# Patient Record
Sex: MALE | Race: WHITE | Employment: FULL TIME | URBAN - METROPOLITAN AREA
[De-identification: names, ages, dates, MRNs, and addresses within clinical notes are randomized per-mention and may not be internally consistent; named-entity substitution may affect disease eponyms.]

---

## 2020-01-03 ENCOUNTER — TELEPHONE (OUTPATIENT)
Dept: ORTHOPEDIC SURGERY | Age: 25
End: 2020-01-03

## 2020-01-03 ENCOUNTER — OFFICE VISIT (OUTPATIENT)
Dept: ORTHOPEDIC SURGERY | Age: 25
End: 2020-01-03
Payer: COMMERCIAL

## 2020-01-03 VITALS — BODY MASS INDEX: 28.88 KG/M2 | WEIGHT: 225 LBS | HEIGHT: 74 IN

## 2020-01-03 PROCEDURE — 99203 OFFICE O/P NEW LOW 30 MIN: CPT | Performed by: ORTHOPAEDIC SURGERY

## 2020-01-03 NOTE — PROGRESS NOTES
Chief Complaint    Ankle Pain (right ankle)      History of Present Illness:  Vane Donnelly is a 21 y.o. Cheryn Derrick for evaluation of his right ankle. States he was playing basketball and felt something pop in the posterior aspect of his right ankle. States it felt like someone hit him in the back of the ankle. He immediately had difficulty putting any type of weight on his ankle. He had a friend that did have crutches and a boot and he did place his ankle to a boot and is been using crutches ever since then. Denies any prior history of injury to this right ankle. Denies any prior history of pain within this right ankle prior to this most recent injury. He does work as an  for [employer] and primarily has a seated job. Medical History:  Patient's medications, allergies, past medical, surgical, social and family histories were reviewed and updated as appropriate. Review of Systems:  Pertinent items are noted in HPI  Review of systems reviewed from Patient History Form dated on 1/3/19 and available in the patient's chart under the Media tab. Vital Signs:  Ht 6' 2\" (1.88 m)   Wt 225 lb (102.1 kg)   BMI 28.89 kg/m²     General Exam:   Constitutional: Patient is adequately groomed with no evidence of malnutrition  DTRs: Deep tendon reflexes are intact  Mental Status: The patient is oriented to time, place and person. The patient's mood and affect are appropriate. Foot Examination:    Inspection: There is some swelling and bruising noted about the posterior aspect of the right ankle    Palpation: There is a palpable defect along the Achilles about 5 cm proximal of the attachment onto the calcaneus. Range of Motion: He does have good passive range of motion of the right ankle    Strength: There is weakness noted with resisted plantarflexion    Special Tests: He has a positive Joyce test    Skin: There are no rashes, ulcerations or lesions.     Gait: Antalgic    Reflex 2+ and

## 2020-01-06 NOTE — PROGRESS NOTES
Do not bring valuables (money, credit cards, checkbooks, etc.) Do not wear any makeup (including no eye makeup) or nail polish on your fingers or toes. 10. DO NOT wear any jewelry or piercings on day of surgery. All body piercing jewelry must be removed. 11. If you have ___dentures, they will be removed before going to the OR; we will provide you a container. If you wear ___contact lenses or ___glasses, they will be removed; please bring a case for them. 12. Please see your family doctor/pediatrician for a history & physical and/or concerning medications. Bring any test results/reports from your physician's office. PCP___[facility]_____________Phone___________H&P Appt. Date________             13 If you  have a Living Will and Durable Power of  for Healthcare, please bring in a copy. 15. Notify your Surgeon if you develop any illness between now and surgery  time, cough, cold, fever, sore throat, nausea, vomiting, etc.  Please notify your surgeon if you experience dizziness, shortness of breath or blurred vision between now & the time of your surgery             15. DO NOT shave your operative site 96 hours prior to surgery. For face & neck surgery, men may use an electric razor 48 hours prior to surgery. 16. Shower the night before or morning of surgery as directed. 17. To provide excellent care visitors will be limited to one in the room at any given time. 18.  Please bring picture ID and insurance card. 19.  Visit our web site for additional information:  [email or web address]              20.During flu season no children under the age of 15 are permitted in the hospital for the safety of all patients.                               21. If you take a long acting insulin in the evening only  take half of your usual  dose the night  before your procedure              22. If you use a c-pap please bring DOS if staying overnight,             23.For your convenience [facility] has a pharmacy on site to fill your prescriptions. 24. If you use oxygen and have a portable tank please bring it  with you the DOS             25. Bring a complete list of all your medications with name and dose include any supplements. 26. Other__________________________________________   *Please call pre admission testing if you any further questions   [address]    [address]  [phone]   [address] [place] [address]       All above information reviewed with patient in person or by phone. Patient verbalizes understanding. All questions and concerns addressed.                                                                                                  Patient/Rep_____pt_______________                                                                                                                                    PRE OP INSTRUCTIONS

## 2020-01-09 ENCOUNTER — ANESTHESIA (OUTPATIENT)
Dept: OPERATING ROOM | Age: 25
End: 2020-01-09
Payer: COMMERCIAL

## 2020-01-09 ENCOUNTER — HOSPITAL ENCOUNTER (OUTPATIENT)
Age: 25
Setting detail: OUTPATIENT SURGERY
Discharge: HOME | End: 2020-01-09
Attending: ORTHOPAEDIC SURGERY | Admitting: ORTHOPAEDIC SURGERY
Payer: COMMERCIAL

## 2020-01-09 ENCOUNTER — ANESTHESIA EVENT (OUTPATIENT)
Dept: OPERATING ROOM | Age: 25
End: 2020-01-09
Payer: COMMERCIAL

## 2020-01-09 VITALS
TEMPERATURE: 98.3 F | HEART RATE: 76 BPM | HEIGHT: 74 IN | WEIGHT: 225 LBS | BODY MASS INDEX: 28.88 KG/M2 | SYSTOLIC BLOOD PRESSURE: 135 MMHG | RESPIRATION RATE: 16 BRPM | DIASTOLIC BLOOD PRESSURE: 75 MMHG | OXYGEN SATURATION: 100 %

## 2020-01-09 VITALS
RESPIRATION RATE: 15 BRPM | TEMPERATURE: 95.2 F | SYSTOLIC BLOOD PRESSURE: 119 MMHG | DIASTOLIC BLOOD PRESSURE: 66 MMHG | OXYGEN SATURATION: 97 %

## 2020-01-09 PROCEDURE — 6360000002 HC RX W HCPCS: Performed by: NURSE ANESTHETIST, CERTIFIED REGISTERED

## 2020-01-09 PROCEDURE — 2709999900 HC NON-CHARGEABLE SUPPLY: Performed by: ORTHOPAEDIC SURGERY

## 2020-01-09 PROCEDURE — 2580000003 HC RX 258: Performed by: NURSE ANESTHETIST, CERTIFIED REGISTERED

## 2020-01-09 PROCEDURE — 3700000001 HC ADD 15 MINUTES (ANESTHESIA): Performed by: ORTHOPAEDIC SURGERY

## 2020-01-09 PROCEDURE — 3700000000 HC ANESTHESIA ATTENDED CARE: Performed by: ORTHOPAEDIC SURGERY

## 2020-01-09 PROCEDURE — 7100000011 HC PHASE II RECOVERY - ADDTL 15 MIN: Performed by: ORTHOPAEDIC SURGERY

## 2020-01-09 PROCEDURE — 7100000000 HC PACU RECOVERY - FIRST 15 MIN: Performed by: ORTHOPAEDIC SURGERY

## 2020-01-09 PROCEDURE — 7100000001 HC PACU RECOVERY - ADDTL 15 MIN: Performed by: ORTHOPAEDIC SURGERY

## 2020-01-09 PROCEDURE — 6360000002 HC RX W HCPCS: Performed by: ORTHOPAEDIC SURGERY

## 2020-01-09 PROCEDURE — 2780000010 HC IMPLANT OTHER: Performed by: ORTHOPAEDIC SURGERY

## 2020-01-09 PROCEDURE — 3600000012 HC SURGERY LEVEL 2 ADDTL 15MIN: Performed by: ORTHOPAEDIC SURGERY

## 2020-01-09 PROCEDURE — 2580000003 HC RX 258: Performed by: ORTHOPAEDIC SURGERY

## 2020-01-09 PROCEDURE — 2500000003 HC RX 250 WO HCPCS: Performed by: NURSE ANESTHETIST, CERTIFIED REGISTERED

## 2020-01-09 PROCEDURE — 2500000003 HC RX 250 WO HCPCS: Performed by: ORTHOPAEDIC SURGERY

## 2020-01-09 PROCEDURE — 3600000002 HC SURGERY LEVEL 2 BASE: Performed by: ORTHOPAEDIC SURGERY

## 2020-01-09 PROCEDURE — 7100000010 HC PHASE II RECOVERY - FIRST 15 MIN: Performed by: ORTHOPAEDIC SURGERY

## 2020-01-09 PROCEDURE — 6370000000 HC RX 637 (ALT 250 FOR IP): Performed by: ANESTHESIOLOGY

## 2020-01-09 DEVICE — IMPLANTABLE DEVICE
Type: IMPLANTABLE DEVICE | Site: ACHILLES TENDON | Status: FUNCTIONAL
Brand: BIOINDUCTIVE IMPLANT WITH ARTHROSCOPIC DELIVERY SYSTEM - MEDIUM

## 2020-01-09 RX ORDER — ONDANSETRON 2 MG/ML
INJECTION INTRAMUSCULAR; INTRAVENOUS PRN
Status: DISCONTINUED | OUTPATIENT
Start: 2020-01-09 | End: 2020-01-09 | Stop reason: SDUPTHER

## 2020-01-09 RX ORDER — FENTANYL CITRATE 50 UG/ML
INJECTION, SOLUTION INTRAMUSCULAR; INTRAVENOUS PRN
Status: DISCONTINUED | OUTPATIENT
Start: 2020-01-09 | End: 2020-01-09 | Stop reason: SDUPTHER

## 2020-01-09 RX ORDER — ROCURONIUM BROMIDE 10 MG/ML
INJECTION, SOLUTION INTRAVENOUS PRN
Status: DISCONTINUED | OUTPATIENT
Start: 2020-01-09 | End: 2020-01-09 | Stop reason: SDUPTHER

## 2020-01-09 RX ORDER — KETOROLAC TROMETHAMINE 30 MG/ML
INJECTION, SOLUTION INTRAMUSCULAR; INTRAVENOUS PRN
Status: DISCONTINUED | OUTPATIENT
Start: 2020-01-09 | End: 2020-01-09 | Stop reason: SDUPTHER

## 2020-01-09 RX ORDER — SODIUM CHLORIDE 9 MG/ML
INJECTION, SOLUTION INTRAVENOUS CONTINUOUS
Status: DISCONTINUED | OUTPATIENT
Start: 2020-01-09 | End: 2020-01-09 | Stop reason: HOSPADM

## 2020-01-09 RX ORDER — ACETAMINOPHEN 500 MG
1000 TABLET ORAL EVERY 6 HOURS PRN
Qty: 60 TABLET | Refills: 3 | Status: SHIPPED | OUTPATIENT
Start: 2020-01-09

## 2020-01-09 RX ORDER — SODIUM CHLORIDE 9 MG/ML
INJECTION, SOLUTION INTRAVENOUS CONTINUOUS PRN
Status: DISCONTINUED | OUTPATIENT
Start: 2020-01-09 | End: 2020-01-09 | Stop reason: SDUPTHER

## 2020-01-09 RX ORDER — PROMETHAZINE HYDROCHLORIDE 25 MG/ML
6.25 INJECTION, SOLUTION INTRAMUSCULAR; INTRAVENOUS
Status: DISCONTINUED | OUTPATIENT
Start: 2020-01-09 | End: 2020-01-09 | Stop reason: HOSPADM

## 2020-01-09 RX ORDER — SODIUM CHLORIDE 0.9 % (FLUSH) 0.9 %
10 SYRINGE (ML) INJECTION EVERY 12 HOURS SCHEDULED
Status: DISCONTINUED | OUTPATIENT
Start: 2020-01-09 | End: 2020-01-09 | Stop reason: HOSPADM

## 2020-01-09 RX ORDER — HYDROMORPHONE HCL 110MG/55ML
0.5 PATIENT CONTROLLED ANALGESIA SYRINGE INTRAVENOUS EVERY 5 MIN PRN
Status: DISCONTINUED | OUTPATIENT
Start: 2020-01-09 | End: 2020-01-09 | Stop reason: HOSPADM

## 2020-01-09 RX ORDER — CEFAZOLIN SODIUM 2 G/100ML
2 INJECTION, SOLUTION INTRAVENOUS
Status: COMPLETED | OUTPATIENT
Start: 2020-01-09 | End: 2020-01-09

## 2020-01-09 RX ORDER — LIDOCAINE HYDROCHLORIDE 20 MG/ML
INJECTION, SOLUTION EPIDURAL; INFILTRATION; INTRACAUDAL; PERINEURAL PRN
Status: DISCONTINUED | OUTPATIENT
Start: 2020-01-09 | End: 2020-01-09 | Stop reason: SDUPTHER

## 2020-01-09 RX ORDER — PROPOFOL 10 MG/ML
INJECTION, EMULSION INTRAVENOUS PRN
Status: DISCONTINUED | OUTPATIENT
Start: 2020-01-09 | End: 2020-01-09 | Stop reason: SDUPTHER

## 2020-01-09 RX ORDER — FENTANYL CITRATE 50 UG/ML
25 INJECTION, SOLUTION INTRAMUSCULAR; INTRAVENOUS EVERY 5 MIN PRN
Status: DISCONTINUED | OUTPATIENT
Start: 2020-01-09 | End: 2020-01-09 | Stop reason: HOSPADM

## 2020-01-09 RX ORDER — DEXAMETHASONE SODIUM PHOSPHATE 4 MG/ML
INJECTION, SOLUTION INTRA-ARTICULAR; INTRALESIONAL; INTRAMUSCULAR; INTRAVENOUS; SOFT TISSUE PRN
Status: DISCONTINUED | OUTPATIENT
Start: 2020-01-09 | End: 2020-01-09 | Stop reason: SDUPTHER

## 2020-01-09 RX ORDER — MIDAZOLAM HYDROCHLORIDE 1 MG/ML
INJECTION INTRAMUSCULAR; INTRAVENOUS PRN
Status: DISCONTINUED | OUTPATIENT
Start: 2020-01-09 | End: 2020-01-09 | Stop reason: SDUPTHER

## 2020-01-09 RX ORDER — SUCCINYLCHOLINE/SOD CL,ISO/PF 200MG/10ML
SYRINGE (ML) INTRAVENOUS PRN
Status: DISCONTINUED | OUTPATIENT
Start: 2020-01-09 | End: 2020-01-09 | Stop reason: SDUPTHER

## 2020-01-09 RX ORDER — OXYCODONE HYDROCHLORIDE 5 MG/1
10 TABLET ORAL ONCE
Status: COMPLETED | OUTPATIENT
Start: 2020-01-09 | End: 2020-01-09

## 2020-01-09 RX ORDER — OXYCODONE HYDROCHLORIDE 5 MG/1
5 TABLET ORAL EVERY 4 HOURS PRN
Qty: 30 TABLET | Refills: 0 | Status: SHIPPED | OUTPATIENT
Start: 2020-01-09 | End: 2020-01-16

## 2020-01-09 RX ORDER — OXYCODONE HYDROCHLORIDE 5 MG/1
TABLET ORAL
Status: DISCONTINUED
Start: 2020-01-09 | End: 2020-01-09 | Stop reason: WASHOUT

## 2020-01-09 RX ORDER — LABETALOL HYDROCHLORIDE 5 MG/ML
5 INJECTION, SOLUTION INTRAVENOUS EVERY 10 MIN PRN
Status: DISCONTINUED | OUTPATIENT
Start: 2020-01-09 | End: 2020-01-09 | Stop reason: HOSPADM

## 2020-01-09 RX ORDER — SODIUM CHLORIDE 0.9 % (FLUSH) 0.9 %
10 SYRINGE (ML) INJECTION PRN
Status: DISCONTINUED | OUTPATIENT
Start: 2020-01-09 | End: 2020-01-09 | Stop reason: HOSPADM

## 2020-01-09 RX ORDER — IBUPROFEN 800 MG/1
800 TABLET ORAL EVERY 6 HOURS PRN
Qty: 60 TABLET | Refills: 3 | Status: SHIPPED | OUTPATIENT
Start: 2020-01-09

## 2020-01-09 RX ORDER — BUPIVACAINE HYDROCHLORIDE 5 MG/ML
INJECTION, SOLUTION EPIDURAL; INTRACAUDAL
Status: COMPLETED | OUTPATIENT
Start: 2020-01-09 | End: 2020-01-09

## 2020-01-09 RX ORDER — SODIUM CHLORIDE, SODIUM LACTATE, POTASSIUM CHLORIDE, CALCIUM CHLORIDE 600; 310; 30; 20 MG/100ML; MG/100ML; MG/100ML; MG/100ML
INJECTION, SOLUTION INTRAVENOUS CONTINUOUS
Status: DISCONTINUED | OUTPATIENT
Start: 2020-01-09 | End: 2020-01-09 | Stop reason: HOSPADM

## 2020-01-09 RX ORDER — LIDOCAINE HYDROCHLORIDE 10 MG/ML
0.5 INJECTION, SOLUTION EPIDURAL; INFILTRATION; INTRACAUDAL; PERINEURAL ONCE
Status: DISCONTINUED | OUTPATIENT
Start: 2020-01-09 | End: 2020-01-09 | Stop reason: HOSPADM

## 2020-01-09 RX ADMIN — DEXAMETHASONE SODIUM PHOSPHATE 8 MG: 4 INJECTION, SOLUTION INTRAMUSCULAR; INTRAVENOUS at 12:40

## 2020-01-09 RX ADMIN — CEFAZOLIN SODIUM 2 G: 2 INJECTION, SOLUTION INTRAVENOUS at 12:12

## 2020-01-09 RX ADMIN — ONDANSETRON 4 MG: 2 INJECTION INTRAMUSCULAR; INTRAVENOUS at 13:17

## 2020-01-09 RX ADMIN — PROPOFOL 50 MG: 10 INJECTION, EMULSION INTRAVENOUS at 12:31

## 2020-01-09 RX ADMIN — OXYCODONE HYDROCHLORIDE 10 MG: 5 TABLET ORAL at 14:33

## 2020-01-09 RX ADMIN — KETOROLAC TROMETHAMINE 30 MG: 60 INJECTION, SOLUTION INTRAMUSCULAR at 13:29

## 2020-01-09 RX ADMIN — FENTANYL CITRATE 50 MCG: 50 INJECTION, SOLUTION INTRAMUSCULAR; INTRAVENOUS at 13:23

## 2020-01-09 RX ADMIN — LIDOCAINE HYDROCHLORIDE 80 MG: 20 INJECTION, SOLUTION EPIDURAL; INFILTRATION; INTRACAUDAL; PERINEURAL at 12:24

## 2020-01-09 RX ADMIN — PROPOFOL 250 MG: 10 INJECTION, EMULSION INTRAVENOUS at 12:24

## 2020-01-09 RX ADMIN — FENTANYL CITRATE 100 MCG: 50 INJECTION, SOLUTION INTRAMUSCULAR; INTRAVENOUS at 12:24

## 2020-01-09 RX ADMIN — Medication 140 MG: at 12:24

## 2020-01-09 RX ADMIN — PROPOFOL 50 MG: 10 INJECTION, EMULSION INTRAVENOUS at 12:38

## 2020-01-09 RX ADMIN — SODIUM CHLORIDE, POTASSIUM CHLORIDE, SODIUM LACTATE AND CALCIUM CHLORIDE: 600; 310; 30; 20 INJECTION, SOLUTION INTRAVENOUS at 12:05

## 2020-01-09 RX ADMIN — MIDAZOLAM 2 MG: 1 INJECTION INTRAMUSCULAR; INTRAVENOUS at 12:17

## 2020-01-09 RX ADMIN — SODIUM CHLORIDE: 9 INJECTION, SOLUTION INTRAVENOUS at 13:13

## 2020-01-09 RX ADMIN — FENTANYL CITRATE 50 MCG: 50 INJECTION, SOLUTION INTRAMUSCULAR; INTRAVENOUS at 13:26

## 2020-01-09 RX ADMIN — ROCURONIUM BROMIDE 5 MG: 10 INJECTION, SOLUTION INTRAVENOUS at 12:24

## 2020-01-09 ASSESSMENT — PULMONARY FUNCTION TESTS
PIF_VALUE: 0
PIF_VALUE: 17
PIF_VALUE: 5
PIF_VALUE: 15
PIF_VALUE: 16
PIF_VALUE: 6
PIF_VALUE: 15
PIF_VALUE: 15
PIF_VALUE: 5
PIF_VALUE: 15
PIF_VALUE: 15
PIF_VALUE: 16
PIF_VALUE: 15
PIF_VALUE: 15
PIF_VALUE: 0
PIF_VALUE: 20
PIF_VALUE: 5
PIF_VALUE: 5
PIF_VALUE: 15
PIF_VALUE: 17
PIF_VALUE: 17
PIF_VALUE: 5
PIF_VALUE: 16
PIF_VALUE: 20
PIF_VALUE: 15
PIF_VALUE: 15
PIF_VALUE: 17
PIF_VALUE: 6
PIF_VALUE: 15
PIF_VALUE: 16
PIF_VALUE: 2
PIF_VALUE: 15
PIF_VALUE: 7
PIF_VALUE: 6
PIF_VALUE: 17
PIF_VALUE: 15
PIF_VALUE: 11
PIF_VALUE: 1
PIF_VALUE: 17
PIF_VALUE: 16
PIF_VALUE: 15
PIF_VALUE: 15
PIF_VALUE: 13
PIF_VALUE: 5
PIF_VALUE: 17
PIF_VALUE: 15
PIF_VALUE: 17
PIF_VALUE: 16
PIF_VALUE: 17
PIF_VALUE: 15
PIF_VALUE: 17
PIF_VALUE: 6
PIF_VALUE: 5
PIF_VALUE: 15
PIF_VALUE: 17
PIF_VALUE: 15
PIF_VALUE: 15
PIF_VALUE: 17
PIF_VALUE: 17
PIF_VALUE: 15
PIF_VALUE: 20
PIF_VALUE: 17
PIF_VALUE: 2
PIF_VALUE: 5
PIF_VALUE: 17
PIF_VALUE: 15
PIF_VALUE: 15
PIF_VALUE: 6
PIF_VALUE: 15
PIF_VALUE: 1
PIF_VALUE: 20
PIF_VALUE: 17
PIF_VALUE: 14
PIF_VALUE: 20
PIF_VALUE: 0
PIF_VALUE: 15
PIF_VALUE: 16
PIF_VALUE: 20
PIF_VALUE: 5
PIF_VALUE: 15
PIF_VALUE: 29
PIF_VALUE: 16
PIF_VALUE: 15
PIF_VALUE: 6
PIF_VALUE: 17
PIF_VALUE: 14
PIF_VALUE: 15
PIF_VALUE: 15
PIF_VALUE: 17
PIF_VALUE: 14
PIF_VALUE: 12
PIF_VALUE: 14
PIF_VALUE: 15
PIF_VALUE: 16

## 2020-01-09 ASSESSMENT — PAIN DESCRIPTION - DESCRIPTORS
DESCRIPTORS: DISCOMFORT
DESCRIPTORS: ACHING
DESCRIPTORS: DISCOMFORT

## 2020-01-09 ASSESSMENT — PAIN - FUNCTIONAL ASSESSMENT
PAIN_FUNCTIONAL_ASSESSMENT: 0-10
PAIN_FUNCTIONAL_ASSESSMENT: PREVENTS OR INTERFERES SOME ACTIVE ACTIVITIES AND ADLS

## 2020-01-09 ASSESSMENT — PAIN DESCRIPTION - ORIENTATION
ORIENTATION: RIGHT

## 2020-01-09 ASSESSMENT — PAIN DESCRIPTION - LOCATION
LOCATION: FOOT

## 2020-01-09 ASSESSMENT — PAIN SCALES - GENERAL
PAINLEVEL_OUTOF10: 5
PAINLEVEL_OUTOF10: 3
PAINLEVEL_OUTOF10: 5

## 2020-01-09 ASSESSMENT — PAIN DESCRIPTION - PAIN TYPE
TYPE: SURGICAL PAIN

## 2020-01-09 NOTE — PROGRESS NOTES
Pt awake and alert. RA, VSS. Family at bedside. Pt c/o some mild discomfort. See MAR. Tolerating PO. Skin warm RLE, palpable pulses and able to wiggle toes. Pt meets criteria to be discharged from phase 1.

## 2020-01-09 NOTE — ANESTHESIA PRE PROCEDURE
[facility] Department of Anesthesiology  Pre-Anesthesia Evaluation/Consultation       Name:  Shauna Lomas  : 1995  Age:  21 y.o. MRN:  [id number]  Date: 2020           Surgeon: Surgeon(s):  Callie Cabello MD    Procedure: Procedure(s):  OPEN REPAIR RIGHT ACHILLES TENDON     No Known Allergies  There is no problem list on file for this patient. History reviewed. No pertinent past medical history. History reviewed. No pertinent surgical history. Social History     Tobacco Use    Smoking status: Never Smoker    Smokeless tobacco: Never Used   Substance Use Topics    Alcohol use: Yes     Comment: occassional    Drug use: No     Medications  No current facility-administered medications on file prior to encounter. Current Outpatient Medications on File Prior to Encounter   Medication Sig Dispense Refill    ibuprofen (ADVIL;MOTRIN) 200 MG tablet Take 800 mg by mouth every 6 hours as needed.        Current Facility-Administered Medications   Medication Dose Route Frequency Provider Last Rate Last Dose    lactated ringers infusion   Intravenous Continuous Callie Cabello MD        lidocaine PF 1 % injection 0.5 mL  0.5 mL Intradermal Once Callie Cabello MD        ceFAZolin (ANCEF) 2 g in dextrose 4 % 100 mL IVPB (premix)  2 g Intravenous On Call to [facility], MD         Vital Signs (Current)   Vitals:    20 1156   BP: (!) 144/87   Pulse: 80   Resp: 12   Temp: 98.9 °F (37.2 °C)   SpO2: 100%     Vital Signs Statistics (for past 48 hrs)     Temp  Av.9 °F (37.2 °C)  Min: 98.9 °F (37.2 °C)   Min taken time: 20 1156  Max: 98.9 °F (37.2 °C)   Max taken time: 20 1156  Pulse  Av  Min: [de-identified]   Min taken time: 20 1156  Max: [de-identified]   Max taken time: 20 1156  Resp  Av  Min: 12   Min taken time: 20 1156  Max: 12   Max taken time: 20 1156  BP  Min: 144/87   Min taken time: 20 1156 ROS            Endo/Other: Negative Endo/Other ROS                    Abdominal:           Vascular: negative vascular ROS. Anesthesia Plan      general     ASA 1     (R/B/A of procedure and anesthesia described. Patient understands and would like to proceed. Questions and concerns addressed.  )  Induction: intravenous. MIPS: Postoperative opioids intended and Prophylactic antiemetics administered. Anesthetic plan and risks discussed with patient. Plan discussed with CRNA. This pre-anesthesia assessment may be used as a history and physical.    DOS STAFF ADDENDUM:    Pt seen and examined, chart reviewed (including anesthesia, drug and allergy history). No interval changes to history and physical examination. Anesthetic plan, risks, benefits, alternatives, and personnel involved discussed with patient. Patient verbalized an understanding and agrees to proceed.       Andi Morton MD  January 9, 2020  12:04 PM

## 2020-01-09 NOTE — ANESTHESIA POSTPROCEDURE EVALUATION
[facility] Department of Anesthesiology  Post-Anesthesia Note       Name:  Ann-Marie Perez                                  Age:  21 y.o. MRN:  [id number]     Last Vitals & Oxygen Saturation: BP (!) 140/86   Pulse 84   Temp 97.9 °F (36.6 °C) (Temporal)   Resp 16   Ht 6' 2\" (1.88 m)   Wt 225 lb (102.1 kg)   SpO2 100%   BMI 28.89 kg/m²   Patient Vitals for the past 4 hrs:   BP Temp Temp src Pulse Resp SpO2 Height Weight   01/09/20 1415 (!) 140/86 97.9 °F (36.6 °C) Temporal 84 16 100 % -- --   01/09/20 1410 (!) 145/93 -- -- 83 -- 100 % -- --   01/09/20 1405 135/85 -- -- 85 -- 100 % -- --   01/09/20 1400 -- -- -- 83 -- 100 % -- --   01/09/20 1358 137/89 97.2 °F (36.2 °C) Temporal 83 15 100 % -- --   01/09/20 1156 (!) 144/87 98.9 °F (37.2 °C) Temporal 80 12 100 % -- --   01/09/20 1148 -- -- -- -- -- -- 6' 2\" (1.88 m) 225 lb (102.1 kg)       Level of consciousness:  Awake, alert    Respiratory: Respirations easy, no distress. Stable. Cardiovascular: Hemodynamically stable. Hydration: Adequate. PONV: Adequately managed. Post-op pain: Adequately controlled. Post-op assessment: Tolerated anesthetic well without complication. Complications:  None.     Jinny Yates MD  January 9, 2020   2:32 PM

## 2020-01-09 NOTE — H&P
I have reviewed the history and physical and examined the patient and find no relevant changes. I have reviewed with the patient and/or family the risks, benefits, and alternatives to the procedure.     Pepe Ennis MD  1/9/2020

## 2020-01-09 NOTE — PROGRESS NOTES
Pt arrived from OR to PACU bay 4. Report received from OR staff. Pt arouses easily to voice. Surgical dressing in place. 3L NC, NSR, VSS. Will continue to monitor.

## 2020-01-10 NOTE — OP NOTE
[address]                     [facility], [address]                                OPERATIVE REPORT    PATIENT NAME: Miranda Hatch                      :        1995  MED REC NO:   [id number]                          ROOM:  ACCOUNT NO:   [de-identified]                           ADMIT DATE: 2020  PROVIDER:     Britni Joseph MD    DATE OF PROCEDURE:  2020    PREOPERATIVE DIAGNOSIS:  Right leg full-thickness Achilles tendon tear. POSTOPERATIVE DIAGNOSIS:  Right leg full-thickness Achilles tendon tear. OPERATION PERFORMED:  Right open Achilles repair. PRIMARY SURGEON:  Britni Joseph MD    ANESTHESIA:  General endotracheal.    COMPLICATIONS:  None. IMPLANTS:  Two Smith and Nephew tape sutures for the repair, one Smith  and Nephew size medium Regeneten bio-inductive scaffold for repair. BLOOD LOSS:  50 mL. TOURNIQUET TIME:  35 minutes at 300 mmHg. CONDITION:  The patient postoperatively was stable. HISTORY:  The patient is a 76-year-old male who is very active, had an  injury to his right Achilles while playing basketball. He states he  went to jump and he felt something pop within his right Achilles. I did  see him in evaluation and on clinical exam, he was consistent with a  full-thickness Achilles tear. Therefore, I did recommend Achilles  repair. After discussing risks and benefits of the procedure with him,  informed consent was obtained. OPERATIVE PROCEDURE:  The patient was seen in the preoperative holding  area. Right ankle was confirmed to be the operative extremity and it  was marked at that period of time. He did receive 2 gm of Ancef  preoperatively. He was then brought back to the operating room in  supine position. General endotracheal anesthesia was started by the  Anesthesia Service. He was then rolled over into the prone position  with all bony prominences padded.   The right ankle at that [id number]    CC:

## 2020-01-24 ENCOUNTER — OFFICE VISIT (OUTPATIENT)
Dept: ORTHOPEDIC SURGERY | Age: 25
End: 2020-01-24

## 2020-01-24 VITALS — WEIGHT: 225.09 LBS | HEIGHT: 74 IN | BODY MASS INDEX: 28.89 KG/M2

## 2020-01-24 PROCEDURE — 99024 POSTOP FOLLOW-UP VISIT: CPT | Performed by: ORTHOPAEDIC SURGERY

## 2020-01-24 NOTE — PROGRESS NOTES
Chief Complaint    Post-Op Check (right ankle)      History of Present Illness:  Francisco Hernández is a 21 y.o. malehere for follow-up for his right ankle. He is 2 weeks status post open Achilles repair. He is doing well at this time. Has been compliant with being nonweightbearing. Denies having any significant pain      Medical History:  Patient's medications, allergies, past medical, surgical, social and family histories were reviewed and updated as appropriate. Review of Systems:  Pertinent items are noted in HPI  Review of systems reviewed from Patient History Form dated on 1/24/20 and available in the patient's chart under the Media tab. Vital Signs:  Ht 6' 2.02\" (1.88 m)   Wt 225 lb 1.4 oz (102.1 kg)   BMI 28.89 kg/m²     General Exam:   Constitutional: Patient is adequately groomed with no evidence of malnutrition  DTRs: Deep tendon reflexes are intact  Mental Status: The patient is oriented to time, place and person. The patient's mood and affect are appropriate. Foot Examination:    Inspection: Surgical incision well-healed. No erythema or drainage    Palpation: No palpable defect over the Achilles today. No significant tenderness palpation    Range of Motion: Passive dorsiflexion is ankle is about 5 degrees short of 0    Strength: Strength was not tested today    Special Tests: Negative Joyce test    Skin: There are no rashes, ulcerations or lesions. Gait: Ambulating with crutches    Reflex 2+ and symmetric    Additional Comments:       Additional Examinations:         Left Lower Extremity: Examination of the left lower extremity does not show any tenderness, deformity or injury. Range of motion is unremarkable. There is no gross instability. There are no rashes, ulcerations or lesions. Strength and tone are normal.            Assessment : Right Achilles rupture    Impression:  Encounter Diagnosis   Name Primary?     Rupture of right Achilles tendon, initial encounter Yes

## 2020-01-28 ENCOUNTER — HOSPITAL ENCOUNTER (OUTPATIENT)
Dept: PHYSICAL THERAPY | Age: 25
Setting detail: THERAPIES SERIES
Discharge: HOME | End: 2020-01-28
Payer: COMMERCIAL

## 2020-01-28 PROCEDURE — 97110 THERAPEUTIC EXERCISES: CPT | Performed by: PHYSICAL THERAPIST

## 2020-01-28 PROCEDURE — 97140 MANUAL THERAPY 1/> REGIONS: CPT | Performed by: PHYSICAL THERAPIST

## 2020-01-28 PROCEDURE — 97161 PT EVAL LOW COMPLEX 20 MIN: CPT | Performed by: PHYSICAL THERAPIST

## 2020-01-28 NOTE — PLAN OF CARE
rest, ice  Provocative factors: standing, walking     Type: []Constant   []Intermittent  []Radiating []Localized []other:     Numbness/Tingling: none    Functional Limitations/Impairments: []Sitting []Standing [x]Walking    [x]Squatting/bending  [x]Stairs           []ADL's  []Transfers [x]Sports/Recreation []Other: basketball, tennis    Occupation/School:   (desk job)     Living Status/Prior Level of Function: Independent with ADLs and IADLs  (insert highest prior level of function)    OBJECTIVE:     ROM LEFT RIGHT   HIP Flex     HIP Abd     HIP Ext     HIP IR     HIP ER     Knee ext     Knee Flex     Ankle PF 50 30   Ankle DF 10 -10   Ankle In 35 20   Ankle Ev 20 10   Strength  LEFT RIGHT  deferred   HIP Flexors     HIP Abductors     HIP Ext     Hip ER     Knee EXT (quad)     Knee Flex (HS)     Ankle DF     Ankle PF     Ankle Inv     Ankle EV          Circumference  Fig-8   63.0cm 66.0cm   LE Dermatomes     LE myotomes       Single Leg Squat: n/a  Single Leg Stance: n/a    Joint mobility:    []Normal    [x]Hypo   []Hyper    Palpation: Right calf    Functional Mobility/Transfers: WFL    Posture: n/a    Bandages/Dressings/Incisions: incision healing well, no drainage or signs of infection. Covered with steri strips    Gait: (include devices/WB status) 50% WB in walking boot, one crutch    Orthopedic Special Tests: (-)Roxy Dears test                       [x] Patient history, allergies, meds reviewed. Medical chart reviewed. See intake form. Review Of Systems (ROS):  [x]Performed Review of systems (Integumentary, CardioPulmonary, Neurological) by intake and observation. Intake form has been scanned into medical record. Patient has been instructed to contact their primary care physician regarding ROS issues if not already being addressed at this time.         Co-morbidities/Complexities (which will affect course of rehabilitation):   [x]None           Arthritic conditions   []Rheumatoid arthritis (M05.9)  []Osteoarthritis (M19.91)   Cardiovascular conditions   []Hypertension (I10)  []Hyperlipidemia (E78.5)  []Angina pectoris (I20)  []Atherosclerosis (I70)   Musculoskeletal conditions   []Disc pathology   []Congenital spine pathologies   []Prior surgical intervention  []Osteoporosis (M81.8)  []Osteopenia (M85.8)   Endocrine conditions   []Hypothyroid (E03.9)  []Hyperthyroid Gastrointestinal conditions   []Constipation (U70.81)   Metabolic conditions   []Morbid obesity (E66.01)  []Diabetes type 1(E10.65) or 2 (E11.65)   []Neuropathy (G60.9)     Pulmonary conditions   []Asthma (J45)  []Coughing   []COPD (J44.9)   Psychological Disorders  []Anxiety (F41.9)  []Depression (F32.9)   []Other:   []Other:          Barriers to/and or personal factors that will affect rehab potential:              []Age  []Sex              []Motivation/Lack of Motivation                        []Co-Morbidities              []Cognitive Function, education/learning barriers              []Environmental, home barriers              []profession/work barriers  []past PT/medical experience  []other:  Justification:     PACEMAKER:  - Denied having a pacemaker that would contraindicate the use of electrical modalities. METAL IMPLANTS:  - Denied metal implants that would contraindicate the use of thermal modalities. CANCER HISTORY:  - Denied a history of cancer that would contraindicate thermal modalities. Falls Risk Assessment (30 days):   [x] Falls Risk assessed and no intervention required.   [] Falls Risk assessed and Patient requires intervention due to being higher risk   TUG score (>12s at risk):     [] Falls education provided, including        ASSESSMENT:   Functional Impairments:     [x]Noted lumbar/proximal hip/LE joint hypomobility   [x]Decreased LE functional ROM   [x]Decreased core/proximal hip strength and neuromuscular control   [x]Decreased LE functional strength   [x]Reduced balance/proprioceptive control   []other: decrease in pain to facilitate improvement in movement, function, and ADLs as indicated by Functional Deficits. [] Progressing: [] Met: [] Not Met: [] Adjusted    Long Term Goals: To be achieved in: 12 weeks  1. Disability index score of 20% or less for the LEFS to assist with reaching prior level of function. [] Progressing: [] Met: [] Not Met: [] Adjusted  2. Patient will demonstrate increased AROM to 10 ankle DF, 35 inv, 20 ever to allow for proper joint functioning as indicated by patients Functional Deficits. [] Progressing: [] Met: [] Not Met: [] Adjusted  3. Patient will demonstrate an increase in Strength to 5/5 throughout ankle to allow for proper functional mobility as indicated by patients Functional Deficits. [] Progressing: [] Met: [] Not Met: [] Adjusted   4. Patient will return to walking with normal gait pattern, no assistive device and without increased symptoms or restriction.    [] Progressing: [] Met: [] Not Met: [] Adjusted     Electronically signed by: Saira Woods PT, DEYANIRA

## 2020-01-28 NOTE — FLOWSHEET NOTE
improving balance, coordination, kinesthetic sense, posture, motor skill, proprioception of core, proximal hip and LE for self care, mobility, lifting, and ambulation/stair navigation      Manual Treatments:  PROM / STM / Oscillations-Mobs:  G-I, II, III, IV (PA's, Inf., Post.)  [x] (45672) Provided manual therapy to mobilize LE, proximal hip and/or LS spine soft tissue/joints for the purpose of modulating pain, promoting relaxation,  increasing ROM, reducing/eliminating soft tissue swelling/inflammation/restriction, improving soft tissue extensibility and allowing for proper ROM for normal function with self care, mobility, lifting and ambulation. Modalities:  CP x 15'    Charges:  Timed Code Treatment Minutes: 30   Total Treatment Minutes: 60     [x] EVAL (LOW) 73195 (typically 20 minutes face-to-face)  [] EVAL (MOD) 54514 (typically 30 minutes face-to-face)  [] EVAL (HIGH) 97756 (typically 45 minutes face-to-face)  [] RE-EVAL     [x] VG(16290) x 1    [] IONTO  [] NMR (06120) x     [] VASO  [x] Manual (43526) x 1     [] Other:  [] TA x      [] Mech Traction (11985)  [] ES(attended) (84606)      [] ES (un) (54306):     GOALS:   Short Term Goals: To be achieved in: 2 weeks  1. Independent in HEP and progression per patient tolerance, in order to prevent re-injury. [] Progressing: [] Met: [] Not Met: [] Adjusted   2. Patient will have a decrease in pain to facilitate improvement in movement, function, and ADLs as indicated by Functional Deficits. [] Progressing: [] Met: [] Not Met: [] Adjusted    Long Term Goals: To be achieved in: 12 weeks  1. Disability index score of 20% or less for the LEFS to assist with reaching prior level of function. [] Progressing: [] Met: [] Not Met: [] Adjusted  2. Patient will demonstrate increased AROM to 10 ankle DF, 35 inv, 20 ever to allow for proper joint functioning as indicated by patients Functional Deficits. [] Progressing: [] Met: [] Not Met: [] Adjusted  3.  Patient

## 2020-01-31 ENCOUNTER — HOSPITAL ENCOUNTER (OUTPATIENT)
Dept: PHYSICAL THERAPY | Age: 25
Setting detail: THERAPIES SERIES
Discharge: HOME | End: 2020-01-31
Payer: COMMERCIAL

## 2020-01-31 PROCEDURE — 97110 THERAPEUTIC EXERCISES: CPT | Performed by: SPECIALIST/TECHNOLOGIST

## 2020-01-31 PROCEDURE — 97140 MANUAL THERAPY 1/> REGIONS: CPT | Performed by: SPECIALIST/TECHNOLOGIST

## 2020-01-31 NOTE — FLOWSHEET NOTE
Stretch: Tableside/ Supine     Piriformis Cross Over     Figure 4 Piriformis     Supine ITB      SKC     DKC     Adductor Stretch     Heel Prop/ Prone Hang     Wallslide     SKTC with SB     BKFO                   Exercises     Ankle pump 30x    Ankle circles 15x each    Ankle ABCs 1x    Add Iso     Quad Sets     SAQ     SLR Flex     SLR ABD     SLR Ext     Clamshells     Prone Donkey Kick     Bridge     Ankle Theraband     BAPS - 4 way X 15 ea New    HR/TR - seated X 30 ea New    Squats     Lateral Walk     Single Leg Balance     EOT Hip Ext/ Donkey Kick                  Manual      Manuals  10'    Knee Mobs/PROM Grade-     Patellar Mobs     Ankle Mobs/PROM Grade-          Therapeutic Exercise and NMR EXR  [x] (65101) Provided verbal/tactile cueing for activities related to strengthening, flexibility, endurance, ROM for improvements in LE, proximal hip, and core control with self care, mobility, lifting, ambulation.  [] (53240) Provided verbal/tactile cueing for activities related to improving balance, coordination, kinesthetic sense, posture, motor skill, proprioception  to assist with LE, proximal hip, and core control in self care, mobility, lifting, ambulation and eccentric single leg control.      NMR and Therapeutic Activities:    [] (80285 or 40732) Provided verbal/tactile cueing for activities related to improving balance, coordination, kinesthetic sense, posture, motor skill, proprioception and motor activation to allow for proper function of core, proximal hip and LE with self care and ADLs  [] (59657) Gait Re-education- Provided training and instruction to the patient for proper LE, core and proximal hip recruitment and positioning and eccentric body weight control with ambulation re-education including up and down stairs     Home Exercise Program:    [x] (81625) Reviewed/Progressed HEP activities related to strengthening, flexibility, endurance, ROM of core, proximal hip and LE for functional self-care, allow for proper joint functioning as indicated by patients Functional Deficits. [] Progressing: [] Met: [] Not Met: [] Adjusted  3. Patient will demonstrate an increase in Strength to 5/5 throughout ankle to allow for proper functional mobility as indicated by patients Functional Deficits. [] Progressing: [] Met: [] Not Met: [] Adjusted   4. Patient will return to walking with normal gait pattern, no assistive device and without increased symptoms or restriction. [] Progressing: [] Met: [] Not Met: [] Adjusted    Progression Towards Functional goals:  [] Patient is progressing as expected towards functional goals listed. [] Progression is slowed due to complexities listed. [] Progression has been slowed due to co-morbidities. [x] Plan just implemented, too soon to assess goals progression  [] Other:     ASSESSMENT:  Limited forefoot and calcaneus motion      Treatment/Activity Tolerance:  [x] Patient tolerated treatment well [] Patient limited by fatique  [] Patient limited by pain  [] Patient limited by other medical complications  [] Other:     Prognosis: [x] Good [] Fair  [] Poor    Patient Requires Follow-up: [x] Yes  [] No    PLAN: See eval  [] Continue per plan of care [] Alter current plan (see comments)  [x] Plan of care initiated [] Hold pending MD visit [] Discharge      Electronically signed by:  Shannon Jones, PTA  [id number], Frederick Chew PT, OMT-C      Note: If patient does not return for scheduled/ recommended follow up visits, this note will serve as a discharge from care along with most recent update on progress.

## 2020-02-03 NOTE — PROGRESS NOTES
I have reviewed and agree to the content of the note created by the Physical Therapist Assistant.     Electronically signed by Josef Metzger PT

## 2020-02-04 ENCOUNTER — HOSPITAL ENCOUNTER (OUTPATIENT)
Dept: PHYSICAL THERAPY | Age: 25
Setting detail: THERAPIES SERIES
Discharge: HOME | End: 2020-02-04
Payer: COMMERCIAL

## 2020-02-04 PROCEDURE — 97110 THERAPEUTIC EXERCISES: CPT | Performed by: PHYSICAL THERAPIST

## 2020-02-04 PROCEDURE — 97140 MANUAL THERAPY 1/> REGIONS: CPT | Performed by: PHYSICAL THERAPIST

## 2020-02-04 NOTE — FLOWSHEET NOTE
Gastroc/ Soleus Stretch  5x30'' each    Hamstring Stretch: Tableside/ Supine     Piriformis Cross Over     Figure 4 Piriformis     Supine ITB      SKC     DKC     Adductor Stretch     Heel Prop/ Prone Hang     Wallslide     SKTC with SB     BKFO                   Exercises     Ankle pump 30x    Ankle circles 15x each    Ankle ABCs 1x    Add Iso     Quad Sets     SAQ     SLR Flex 20x    SLR ABD 20x    SLR Ext     Clamshells     Prone Donkey Kick     Bridge 20x    Ankle Theraband Green TB 20x each 4 way-added to HEP   BAPS - 4 way X 15 ea    HR/TR - seated X 30 ea     Squats     Lateral Walk     Single Leg Balance     EOT Hip Ext/ Donkey Kick                  Manual      Manuals  15'    Knee Mobs/PROM Grade-     Patellar Mobs     Ankle Mobs/PROM Grade-          Therapeutic Exercise and NMR EXR  [x] (13872) Provided verbal/tactile cueing for activities related to strengthening, flexibility, endurance, ROM for improvements in LE, proximal hip, and core control with self care, mobility, lifting, ambulation.  [] (66940) Provided verbal/tactile cueing for activities related to improving balance, coordination, kinesthetic sense, posture, motor skill, proprioception  to assist with LE, proximal hip, and core control in self care, mobility, lifting, ambulation and eccentric single leg control.      NMR and Therapeutic Activities:    [] (88595 or 13610) Provided verbal/tactile cueing for activities related to improving balance, coordination, kinesthetic sense, posture, motor skill, proprioception and motor activation to allow for proper function of core, proximal hip and LE with self care and ADLs  [] (10535) Gait Re-education- Provided training and instruction to the patient for proper LE, core and proximal hip recruitment and positioning and eccentric body weight control with ambulation re-education including up and down stairs     Home Exercise Program:    [x] (08950) Reviewed/Progressed HEP activities related to

## 2020-02-06 ENCOUNTER — HOSPITAL ENCOUNTER (OUTPATIENT)
Dept: PHYSICAL THERAPY | Age: 25
Setting detail: THERAPIES SERIES
Discharge: HOME | End: 2020-02-06
Payer: COMMERCIAL

## 2020-02-06 PROCEDURE — 97140 MANUAL THERAPY 1/> REGIONS: CPT | Performed by: SPECIALIST/TECHNOLOGIST

## 2020-02-06 PROCEDURE — 97110 THERAPEUTIC EXERCISES: CPT | Performed by: SPECIALIST/TECHNOLOGIST

## 2020-02-06 NOTE — FLOWSHEET NOTE
[facility]    [facility] Treatment Note/ Progress Report:   Date:  2020    Patient Name:  Mary Dupree    :  1995  MRN: [id number]  Restrictions/Precautions:    Medical/Treatment Diagnosis Information:  · Diagnosis: Right achilles tendon repair   M25.571  · Treatment Diagnosis: PT treatment diagnosis:  right ankle pain, decreased mobility, weakness, altered gait  F43.085  Insurance/Certification information:  [place]  75 visits/yr, $0 copay  Physician Information:  Referring Practitioner: Dr Sukhdev Quiroz of care signed (Y/N):     Date of Patient follow up with Physician:  20    Is this a Progress Report:     []  Yes  [x]  No        If Yes:  Date Range for reporting period:  Beginning  Ending    Progress report will be due (10 Rx or 30 days whichever is less): 71       Recertification will be due (POC Duration  / 90 days whichever is less): 20         Visit # Insurance Allowable Auth Required   4 75 []  Yes []  No        Functional Scale:     Date assessed:        Latex Allergy:  [x]NO      []YES  Preferred Language for Healthcare:   [x]English       []other    Pain level:  1/10     SUBJECTIVE:  Reports the leg is doing well today but did notice some increased swelling with prolonged standing/walking.       Type: []Constant   []Intermitment  []Radiating []Localized  []other:     Functional Limitations: []Sitting []Standing []Walking    []Squatting []Stairs                []ADL's  []Driving []Sports/Recreation  []Other:      OBJECTIVE:     ROM Current (R) Current (L)                       Strength                           Gait:     Joint mobility:    []Normal    []Hypo   []Hyper    Palpation:     Orthopedic Tests:     RESTRICTIONS/PRECAUTIONS: Achilles repair guidelines, 50% WB with 1 crutch     Exercises/Interventions:             DOS: 20  Stretching Reps Notes/Last Progression   Alfredito Frias Gastroc/ Soleus Stretch  5x30'' each    Hamstring Stretch: Tableside/ Supine     Piriformis Cross Over     Figure 4 Piriformis     Supine ITB      SKC     DKC     Adductor Stretch     Heel Prop/ Prone Hang     Wallslide     SKTC with SB     BKFO                   Exercises        Ankle circles 30x each    Ankle ABCs 1x    Add Iso     Quad Sets     SAQ     SLR Flex 30x    SLR ABD 30x    SLR Ext     Clamshells     Prone Donkey Kick     Bridge 30x    Ankle Theraband Light blue TB 20x each 4 way   BAPS - 4 way X 15 ea    HR/TR - seated X 30 ea     Squats     Lateral Walk     Single Leg Balance     EOT Hip Ext/ Donkey Kick                  Manual      Manuals  15'    Knee Mobs/PROM Grade-     Patellar Mobs     Ankle Mobs/PROM Grade-          Therapeutic Exercise and NMR EXR  [x] (48507) Provided verbal/tactile cueing for activities related to strengthening, flexibility, endurance, ROM for improvements in LE, proximal hip, and core control with self care, mobility, lifting, ambulation.  [] (80262) Provided verbal/tactile cueing for activities related to improving balance, coordination, kinesthetic sense, posture, motor skill, proprioception  to assist with LE, proximal hip, and core control in self care, mobility, lifting, ambulation and eccentric single leg control.      NMR and Therapeutic Activities:    [] (45566 or 46612) Provided verbal/tactile cueing for activities related to improving balance, coordination, kinesthetic sense, posture, motor skill, proprioception and motor activation to allow for proper function of core, proximal hip and LE with self care and ADLs  [] (89788) Gait Re-education- Provided training and instruction to the patient for proper LE, core and proximal hip recruitment and positioning and eccentric body weight control with ambulation re-education including up and down stairs     Home Exercise Program:    [x] (26540) Reviewed/Progressed HEP activities related to strengthening, flexibility, endurance, ROM of core, proximal hip and LE for functional self-care, mobility, lifting and ambulation/stair navigation   [] (11140)Reviewed/Progressed HEP activities related to improving balance, coordination, kinesthetic sense, posture, motor skill, proprioception of core, proximal hip and LE for self care, mobility, lifting, and ambulation/stair navigation      Manual Treatments:  PROM / STM / Oscillations-Mobs:  G-I, II, III, IV (PA's, Inf., Post.) Calcaneous Ev mob, forefoot mob   [x] ([id number]) Provided manual therapy to mobilize LE, proximal hip and/or LS spine soft tissue/joints for the purpose of modulating pain, promoting relaxation,  increasing ROM, reducing/eliminating soft tissue swelling/inflammation/restriction, improving soft tissue extensibility and allowing for proper ROM for normal function with self care, mobility, lifting and ambulation. Modalities:  CP x 15'    Charges:  Timed Code Treatment Minutes: 40   Total Treatment Minutes: 40     [] EVAL (LOW) 93102 (typically 20 minutes face-to-face)  [] EVAL (MOD) 44952 (typically 30 minutes face-to-face)  [] EVAL (HIGH) 54585 (typically 45 minutes face-to-face)  [] RE-EVAL     [x] BN(64471) x 2    [] IONTO  [] NMR (07845) x     [] VASO  [x] Manual (97223) x 1     [] Other:  [] TA x      [] Mech Traction (18196)  [] ES(attended) (78935)      [] ES (un) (30241):     GOALS:   Short Term Goals: To be achieved in: 2 weeks  1. Independent in HEP and progression per patient tolerance, in order to prevent re-injury. [] Progressing: [x] Met: [] Not Met: [] Adjusted   2. Patient will have a decrease in pain to facilitate improvement in movement, function, and ADLs as indicated by Functional Deficits. [] Progressing: [x] Met: [] Not Met: [] Adjusted    Long Term Goals: To be achieved in: 12 weeks  1. Disability index score of 20% or less for the LEFS to assist with reaching prior level of function. [] Progressing: [] Met: [x] Not Met: [] Adjusted  2. Patient will demonstrate increased AROM to 10 ankle DF, 35 inv, 20 ever to allow for proper joint functioning as indicated by patients Functional Deficits. [] Progressing: [] Met: [x] Not Met: [] Adjusted  3. Patient will demonstrate an increase in Strength to 5/5 throughout ankle to allow for proper functional mobility as indicated by patients Functional Deficits. [] Progressing: [] Met: [x] Not Met: [] Adjusted   4. Patient will return to walking with normal gait pattern, no assistive device and without increased symptoms or restriction. [] Progressing: [] Met: [x] Not Met: [] Adjusted    Progression Towards Functional goals:  [] Patient is progressing as expected towards functional goals listed. [] Progression is slowed due to complexities listed. [] Progression has been slowed due to co-morbidities. [x] Plan just implemented, too soon to assess goals progression  [] Other:     ASSESSMENT:   Increased swelling of ankle/foot. Tolerated progression of exercises well with no increase in symptoms but did fatigue easily with T-band and seated HR. Treatment/Activity Tolerance:  [x] Patient tolerated treatment well [] Patient limited by fatique  [] Patient limited by pain  [] Patient limited by other medical complications  [] Other:     Prognosis: [x] Good [] Fair  [] Poor    Patient Requires Follow-up: [x] Yes  [] No    PLAN: See eval  [x] Continue per plan of care [] Alter current plan (see comments)  [] Plan of care initiated [] Hold pending MD visit [] Discharge      Electronically signed by:   Jamarcus Mcmahan PTA  [id number], Luan Artis PT, OMT-C      Note: If patient does not return for scheduled/ recommended follow up visits, this note will serve as a discharge from care along with most recent update on progress.

## 2020-02-07 ENCOUNTER — APPOINTMENT (OUTPATIENT)
Dept: PHYSICAL THERAPY | Age: 25
End: 2020-02-07
Payer: COMMERCIAL

## 2020-02-12 ENCOUNTER — HOSPITAL ENCOUNTER (OUTPATIENT)
Dept: PHYSICAL THERAPY | Age: 25
Setting detail: THERAPIES SERIES
Discharge: HOME | End: 2020-02-12
Payer: COMMERCIAL

## 2020-02-12 PROCEDURE — 97140 MANUAL THERAPY 1/> REGIONS: CPT | Performed by: SPECIALIST/TECHNOLOGIST

## 2020-02-12 PROCEDURE — 97110 THERAPEUTIC EXERCISES: CPT | Performed by: SPECIALIST/TECHNOLOGIST

## 2020-02-12 NOTE — FLOWSHEET NOTE
[facility], [facility]    Physical Therapy Treatment Note/ Progress Report:   Date:  2020    Patient Name:  Marilyn Ken    :  1995  MRN: [id number]  Restrictions/Precautions:    Medical/Treatment Diagnosis Information:  · Diagnosis: Right achilles tendon repair   M25.571  · Treatment Diagnosis: PT treatment diagnosis:  right ankle pain, decreased mobility, weakness, altered gait  T94.035  Insurance/Certification information:  [place]  75 visits/yr, $0 copay  Physician Information:  Referring Practitioner: Dr Judi Rodriguez of care signed (Y/N):     Date of Patient follow up with Physician:  20    Is this a Progress Report:     []  Yes  [x]  No        If Yes:  Date Range for reporting period:  Beginning  Ending    Progress report will be due (10 Rx or 30 days whichever is less):        Recertification will be due (POC Duration  / 90 days whichever is less): 20         Visit # Insurance Allowable Auth Required   5 75 []  Yes []  No        Functional Scale:     Date assessed:        Latex Allergy:  [x]NO      []YES  Preferred Language for Healthcare:   [x]English       []other    Pain level:  1/10     SUBJECTIVE:  Reports the leg is doing well today but did notice some increased swelling with prolonged standing/walking.       Type: []Constant   []Intermitment  []Radiating []Localized  []other:     Functional Limitations: []Sitting []Standing []Walking    []Squatting []Stairs                []ADL's  []Driving []Sports/Recreation  []Other:      OBJECTIVE:     ROM Current (R) Current (L)                       Strength                           Gait:     Joint mobility:    []Normal    []Hypo   []Hyper    Palpation:     Orthopedic Tests:     RESTRICTIONS/PRECAUTIONS: Achilles repair guidelines, 50% WB with 1 crutch     Exercises/Interventions:             DOS: 20  Stretching Reps Notes/Last Progression   Bike  5'    Godwin Frias Gastroc/ Soleus Stretch  5x30'' each    Hamstring Stretch: Tableside/ Supine     Piriformis Cross Over     Figure 4 Piriformis     Supine ITB      SKC     DKC     Adductor Stretch     Heel Prop/ Prone Hang     Wallslide     SKTC with SB     BKFO                   Exercises        Ankle circles 30x each    Ankle ABCs 1x    Add Iso     Quad Sets     SAQ     SLR Flex 1# 30x    SLR ABD 1# 30x    SLR Ext     Clamshells     Prone Donkey Kick     Bridge 30x    Ankle Theraband Light blue TB 30x each 4 way   BAPS - 4 way X 20 ea    HR/TR - seated X 30 ea     Towel crunches 2'    Lateral Walk     Single Leg Balance     EOT Hip Ext/ Donkey Kick                  Manual      Manuals  15'    Knee Mobs/PROM Grade-     Patellar Mobs     Ankle Mobs/PROM Grade-          Therapeutic Exercise and NMR EXR  [x] (25321) Provided verbal/tactile cueing for activities related to strengthening, flexibility, endurance, ROM for improvements in LE, proximal hip, and core control with self care, mobility, lifting, ambulation.  [] (32757) Provided verbal/tactile cueing for activities related to improving balance, coordination, kinesthetic sense, posture, motor skill, proprioception  to assist with LE, proximal hip, and core control in self care, mobility, lifting, ambulation and eccentric single leg control.      NMR and Therapeutic Activities:    [] (53933 or 14979) Provided verbal/tactile cueing for activities related to improving balance, coordination, kinesthetic sense, posture, motor skill, proprioception and motor activation to allow for proper function of core, proximal hip and LE with self care and ADLs  [] (05334) Gait Re-education- Provided training and instruction to the patient for proper LE, core and proximal hip recruitment and positioning and eccentric body weight control with ambulation re-education including up and down stairs     Home Exercise Program:    [x] (31199) Reviewed/Progressed HEP activities related to strengthening, flexibility, endurance, ROM of core, proximal hip and LE for functional self-care, mobility, lifting and ambulation/stair navigation   [] (07086)Reviewed/Progressed HEP activities related to improving balance, coordination, kinesthetic sense, posture, motor skill, proprioception of core, proximal hip and LE for self care, mobility, lifting, and ambulation/stair navigation      Manual Treatments:  PROM / STM / Oscillations-Mobs:  G-I, II, III, IV (PA's, Inf., Post.) Calcaneous Ev mob, forefoot mob   [x] ([id number]) Provided manual therapy to mobilize LE, proximal hip and/or LS spine soft tissue/joints for the purpose of modulating pain, promoting relaxation,  increasing ROM, reducing/eliminating soft tissue swelling/inflammation/restriction, improving soft tissue extensibility and allowing for proper ROM for normal function with self care, mobility, lifting and ambulation. Modalities:  CP x 15'    Charges:  Timed Code Treatment Minutes: 40   Total Treatment Minutes: 40     [] EVAL (LOW) 85234 (typically 20 minutes face-to-face)  [] EVAL (MOD) 41604 (typically 30 minutes face-to-face)  [] EVAL (HIGH) 08573 (typically 45 minutes face-to-face)  [] RE-EVAL     [x] BC(62205) x 2    [] IONTO  [] NMR (53047) x     [] VASO  [x] Manual (19578) x 1     [] Other:  [] TA x      [] Mech Traction (66956)  [] ES(attended) (52085)      [] ES (un) (95957):     GOALS:   Short Term Goals: To be achieved in: 2 weeks  1. Independent in HEP and progression per patient tolerance, in order to prevent re-injury. [] Progressing: [x] Met: [] Not Met: [] Adjusted   2. Patient will have a decrease in pain to facilitate improvement in movement, function, and ADLs as indicated by Functional Deficits. [] Progressing: [x] Met: [] Not Met: [] Adjusted    Long Term Goals: To be achieved in: 12 weeks  1. Disability index score of 20% or less for the LEFS to assist with reaching prior level of function.    [] Progressing: [] Met: [x] Not Met: []

## 2020-02-12 NOTE — PROGRESS NOTES
I have reviewed and agree to the content of the note created by the Physical Therapist Assistant.     Electronically signed by Evelia Heath PT

## 2020-02-14 ENCOUNTER — HOSPITAL ENCOUNTER (OUTPATIENT)
Dept: PHYSICAL THERAPY | Age: 25
Setting detail: THERAPIES SERIES
Discharge: HOME | End: 2020-02-14
Payer: COMMERCIAL

## 2020-02-14 PROCEDURE — 97140 MANUAL THERAPY 1/> REGIONS: CPT | Performed by: SPECIALIST/TECHNOLOGIST

## 2020-02-14 PROCEDURE — 97110 THERAPEUTIC EXERCISES: CPT | Performed by: SPECIALIST/TECHNOLOGIST

## 2020-02-14 NOTE — FLOWSHEET NOTE
re-education including up and down stairs     Home Exercise Program:    [x] (64148) Reviewed/Progressed HEP activities related to strengthening, flexibility, endurance, ROM of core, proximal hip and LE for functional self-care, mobility, lifting and ambulation/stair navigation   [] (63973)Reviewed/Progressed HEP activities related to improving balance, coordination, kinesthetic sense, posture, motor skill, proprioception of core, proximal hip and LE for self care, mobility, lifting, and ambulation/stair navigation      Manual Treatments:  PROM / STM / Oscillations-Mobs:  G-I, II, III, IV (PA's, Inf., Post.) Calcaneous Ev mob, forefoot mob   [x] ([id number]) Provided manual therapy to mobilize LE, proximal hip and/or LS spine soft tissue/joints for the purpose of modulating pain, promoting relaxation,  increasing ROM, reducing/eliminating soft tissue swelling/inflammation/restriction, improving soft tissue extensibility and allowing for proper ROM for normal function with self care, mobility, lifting and ambulation. Modalities:  CP x 15'    Charges:  Timed Code Treatment Minutes: 40   Total Treatment Minutes: 40     [] EVAL (LOW) 71285 (typically 20 minutes face-to-face)  [] EVAL (MOD) 79231 (typically 30 minutes face-to-face)  [] EVAL (HIGH) 16728 (typically 45 minutes face-to-face)  [] RE-EVAL     [x] EO(75958) x 2    [] IONTO  [] NMR (95818) x     [] VASO  [x] Manual (16782) x 1     [] Other:  [] TA x      [] Mech Traction (80259)  [] ES(attended) (19931)      [] ES (un) (15668):     GOALS:   Short Term Goals: To be achieved in: 2 weeks  1. Independent in HEP and progression per patient tolerance, in order to prevent re-injury. [] Progressing: [x] Met: [] Not Met: [] Adjusted   2. Patient will have a decrease in pain to facilitate improvement in movement, function, and ADLs as indicated by Functional Deficits. [] Progressing: [x] Met: [] Not Met: [] Adjusted    Long Term Goals: To be achieved in: 12 weeks  1. Disability index score of 20% or less for the LEFS to assist with reaching prior level of function. [] Progressing: [] Met: [x] Not Met: [] Adjusted  2. Patient will demonstrate increased AROM to 10 ankle DF, 35 inv, 20 ever to allow for proper joint functioning as indicated by patients Functional Deficits. [] Progressing: [] Met: [x] Not Met: [] Adjusted  3. Patient will demonstrate an increase in Strength to 5/5 throughout ankle to allow for proper functional mobility as indicated by patients Functional Deficits. [] Progressing: [] Met: [x] Not Met: [] Adjusted   4. Patient will return to walking with normal gait pattern, no assistive device and without increased symptoms or restriction. [] Progressing: [] Met: [x] Not Met: [] Adjusted    Progression Towards Functional goals:  [] Patient is progressing as expected towards functional goals listed. [] Progression is slowed due to complexities listed. [] Progression has been slowed due to co-morbidities. [x] Plan just implemented, too soon to assess goals progression  [] Other:     ASSESSMENT:   Increased swelling of ankle/foot. Tolerated progression of exercises well with no increase in symptoms but did fatigue easily with T-band and seated HR. Treatment/Activity Tolerance:  [x] Patient tolerated treatment well [] Patient limited by fatique  [] Patient limited by pain  [] Patient limited by other medical complications  [] Other:     Prognosis: [x] Good [] Fair  [] Poor    Patient Requires Follow-up: [x] Yes  [] No    PLAN: See eval  [x] Continue per plan of care [] Alter current plan (see comments)  [] Plan of care initiated [] Hold pending MD visit [] Discharge      Electronically signed by:   Jinny Lopez, PTA  [id number], Donte Aquino PT, OMT-C      Note: If patient does not return for scheduled/ recommended follow up visits, this note will serve as a discharge from care along with most recent update on progress.

## 2020-02-18 ENCOUNTER — OFFICE VISIT (OUTPATIENT)
Dept: ORTHOPEDIC SURGERY | Age: 25
End: 2020-02-18

## 2020-02-18 VITALS — RESPIRATION RATE: 17 BRPM | HEIGHT: 74 IN | BODY MASS INDEX: 28.89 KG/M2 | WEIGHT: 225.09 LBS

## 2020-02-18 PROCEDURE — 99024 POSTOP FOLLOW-UP VISIT: CPT | Performed by: ORTHOPAEDIC SURGERY

## 2020-02-18 NOTE — PROGRESS NOTES
Chief Complaint    Follow-up (right ankle)      History of Present Illness:  Nicholas Ramirez is a 21 y.o. malehere for follow-up for his right ankle. He is now 6 weeks status post right open Achilles repair. He is doing well at this time. Denies have any significant pain within the ankle. Making good progress with physical therapy. Medical History:  Patient's medications, allergies, past medical, surgical, social and family histories were reviewed and updated as appropriate. Review of Systems:  Pertinent items are noted in HPI  Review of systems reviewed from Patient History Form dated on 2/18/20 and available in the patient's chart under the Media tab. Vital Signs:  Resp 17   Ht 6' 2.02\" (1.88 m)   Wt 225 lb 1.4 oz (102.1 kg)   BMI 28.89 kg/m²     General Exam:   Constitutional: Patient is adequately groomed with no evidence of malnutrition  DTRs: Deep tendon reflexes are intact  Mental Status: The patient is oriented to time, place and person. The patient's mood and affect are appropriate. Foot Examination:    Inspection: Surgical incision is well-healed over the posterior aspect of the right ankle today    Palpation: No palpable defect within the right Achilles. No tenderness palpation    Range of Motion: Passive dorsiflexion of the ankle is about 5 degrees beyond neutral    Strength: He does have some atrophy within his calf. Weakness with resisted plantarflexion    Special Tests: Negative Joyce test.  Negative Homans sign    Skin: There are no rashes, ulcerations or lesions. Gait: Antalgic    Reflex 2+ and symmetric    Additional Comments:       Additional Examinations:         Left Lower Extremity: Examination of the left lower extremity does not show any tenderness, deformity or injury. Range of motion is unremarkable. There is no gross instability. There are no rashes, ulcerations or lesions.   Strength and tone are normal.            Assessment : Status post right open Achilles repair    Impression:  Encounter Diagnosis   Name Primary?  Rupture of right Achilles tendon, initial encounter Yes       Office Procedures:  No orders of the defined types were placed in this encounter. Treatment Plan: He is doing well at this time. I do want him to continue to use the boot for an additional 2 weeks while he is up and ambulating and if doing well with the boot over the next 2 weeks and can fully weight-bear he may start weaning out of the boot. He is going to continue with physical therapy to continue to work on mobilization and strengthening.   I am going to see him back in clinic in 6 weeks for follow-up

## 2020-02-19 ENCOUNTER — HOSPITAL ENCOUNTER (OUTPATIENT)
Dept: PHYSICAL THERAPY | Age: 25
Setting detail: THERAPIES SERIES
Discharge: HOME | End: 2020-02-19
Payer: COMMERCIAL

## 2020-02-19 PROCEDURE — 97110 THERAPEUTIC EXERCISES: CPT | Performed by: SPECIALIST/TECHNOLOGIST

## 2020-02-19 PROCEDURE — 97140 MANUAL THERAPY 1/> REGIONS: CPT | Performed by: SPECIALIST/TECHNOLOGIST

## 2020-02-19 NOTE — FLOWSHEET NOTE
hip recruitment and positioning and eccentric body weight control with ambulation re-education including up and down stairs     Home Exercise Program:    [x] (64539) Reviewed/Progressed HEP activities related to strengthening, flexibility, endurance, ROM of core, proximal hip and LE for functional self-care, mobility, lifting and ambulation/stair navigation   [] (69578)Reviewed/Progressed HEP activities related to improving balance, coordination, kinesthetic sense, posture, motor skill, proprioception of core, proximal hip and LE for self care, mobility, lifting, and ambulation/stair navigation      Manual Treatments:  PROM / STM / Oscillations-Mobs:  G-I, II, III, IV (PA's, Inf., Post.) Calcaneous Ev mob, forefoot mob   [x] (80935) Provided manual therapy to mobilize LE, proximal hip and/or LS spine soft tissue/joints for the purpose of modulating pain, promoting relaxation,  increasing ROM, reducing/eliminating soft tissue swelling/inflammation/restriction, improving soft tissue extensibility and allowing for proper ROM for normal function with self care, mobility, lifting and ambulation. Modalities:  CP x 15'    Charges:  Timed Code Treatment Minutes: 40   Total Treatment Minutes: 40     [] EVAL (LOW) 96217 (typically 20 minutes face-to-face)  [] EVAL (MOD) 22871 (typically 30 minutes face-to-face)  [] EVAL (HIGH) 26746 (typically 45 minutes face-to-face)  [] RE-EVAL     [x] KB(04021) x 2    [] IONTO  [] NMR (99852) x     [] VASO  [x] Manual (74548) x 1     [] Other:  [] TA x      [] Mech Traction (90765)  [] ES(attended) (74594)      [] ES (un) (64638):     GOALS:   Short Term Goals: To be achieved in: 2 weeks  1. Independent in HEP and progression per patient tolerance, in order to prevent re-injury. [] Progressing: [x] Met: [] Not Met: [] Adjusted   2. Patient will have a decrease in pain to facilitate improvement in movement, function, and ADLs as indicated by Functional Deficits.   [] Progressing: [x] Met: [] Not Met: [] Adjusted    Long Term Goals: To be achieved in: 12 weeks  1. Disability index score of 20% or less for the LEFS to assist with reaching prior level of function. [] Progressing: [] Met: [x] Not Met: [] Adjusted  2. Patient will demonstrate increased AROM to 10 ankle DF, 35 inv, 20 ever to allow for proper joint functioning as indicated by patients Functional Deficits. [] Progressing: [] Met: [x] Not Met: [] Adjusted  3. Patient will demonstrate an increase in Strength to 5/5 throughout ankle to allow for proper functional mobility as indicated by patients Functional Deficits. [] Progressing: [] Met: [x] Not Met: [] Adjusted   4. Patient will return to walking with normal gait pattern, no assistive device and without increased symptoms or restriction. [] Progressing: [] Met: [x] Not Met: [] Adjusted    Progression Towards Functional goals:  [] Patient is progressing as expected towards functional goals listed. [] Progression is slowed due to complexities listed. [] Progression has been slowed due to co-morbidities. [x] Plan just implemented, too soon to assess goals progression  [] Other:     ASSESSMENT:   Increased swelling of ankle/foot. Tolerated progression of exercises well with no increase in symptoms but did fatigue easily with T-band and seated HR.      Treatment/Activity Tolerance:  [x] Patient tolerated treatment well [] Patient limited by fatique  [] Patient limited by pain  [] Patient limited by other medical complications  [] Other:     Prognosis: [x] Good [] Fair  [] Poor    Patient Requires Follow-up: [x] Yes  [] No    PLAN: See eval  [x] Continue per plan of care [] Alter current plan (see comments)  [] Plan of care initiated [] Hold pending MD visit [] Discharge      Electronically signed by:   Jasson Mariscal, [address], Bison Alisha PT, OMT-C      Note: If patient does not return for scheduled/ recommended follow up visits, this note will serve as a discharge from care along with most recent update on progress.

## 2020-02-25 ENCOUNTER — APPOINTMENT (OUTPATIENT)
Dept: PHYSICAL THERAPY | Age: 25
End: 2020-02-25
Payer: COMMERCIAL

## 2020-02-26 ENCOUNTER — HOSPITAL ENCOUNTER (OUTPATIENT)
Dept: PHYSICAL THERAPY | Age: 25
Setting detail: THERAPIES SERIES
Discharge: HOME | End: 2020-02-26
Payer: COMMERCIAL

## 2020-02-26 PROCEDURE — 97110 THERAPEUTIC EXERCISES: CPT | Performed by: SPECIALIST/TECHNOLOGIST

## 2020-02-26 PROCEDURE — 97112 NEUROMUSCULAR REEDUCATION: CPT | Performed by: SPECIALIST/TECHNOLOGIST

## 2020-02-26 NOTE — FLOWSHEET NOTE
The [facility]- Orthopaedics and Sports Rehabilitation, [facility]    Physical Therapy Treatment Note/ Progress Report:   Date:  2020    Patient Name:  Jamal Gary    :  1995  MRN: [id number]  Restrictions/Precautions:    Medical/Treatment Diagnosis Information:  · Diagnosis: Right achilles tendon repair   M25.571  · Treatment Diagnosis: PT treatment diagnosis:  right ankle pain, decreased mobility, weakness, altered gait  N19.889  Insurance/Certification information:  [place]  75 visits/yr, $0 copay  Physician Information:  Referring Practitioner: Dr Yasemin Alexander of care signed (Y/N):     Date of Patient follow up with Physician:  3/31/20    Is this a Progress Report:     []  Yes  [x]  No        If Yes:  Date Range for reporting period:  Beginning20  Ending20    Progress report will be due (16 Rx or 30 days whichever is less): 4/01/15       Recertification will be due (POC Duration  / 90 days whichever is less): 20         Visit # Insurance Allowable Auth Required   8 75 []  Yes []  No        Functional Scale:  LEFS 28%   Date assessed:  20      Latex Allergy:  [x]NO      []YES  Preferred Language for Healthcare:   [x]English       []other    Pain level:  1/10     SUBJECTIVE:  Reports the achilles feel pretty good lately. Type: []Constant   [x]Intermitment  []Radiating []Localized  []other:     Functional Limitations: []Sitting [x]Standing [x]Walking    [x]Squatting [x]Stairs                [x]ADL's  []Driving []Sports/Recreation  []Other:      OBJECTIVE:     ROM Current (R)   Ankle DF 11°   PF 47°   Inv 35°   EV 18°   Strength N/T                     Gait: ambulating with boot and 1 crutch with minimal to no difficulties.      Joint mobility:    []Normal    [x]Hypo   []Hyper    Palpation:     Orthopedic Tests:     RESTRICTIONS/PRECAUTIONS: Achilles repair guidelines, wear the boot for 2 more wks and then wean out of the boot per MD note on 20    Exercises/Interventions: DOS: 1/9/20  Stretching Reps Notes/Last Progression   Bike  5'    Airdyne     Eliptical     Gastroc/ Soleus Stretch  3x30'' each slant   Hamstring Stretch: Tableside/ Supine     Piriformis Cross Over     Figure 4 Piriformis     Supine ITB      SKC     DKC     Adductor Stretch     Heel Prop/ Prone Hang     Wallslide     SKTC with SB     BKFO                   Exercises              Add Iso     Quad Sets     SAQ           SLR Ext     Clamshells     Manual PF 2 x 20    Bridge - SL X 20 ea     Ankle Theraband  blue TB 30x each 4 way      HR/TR - seated X 30 ea          SLB 3 x 30\"    3 way balance 1' ea 1/2 roll    Mini squats on BOSU - blue side X 15     PEP 2'    Leg press HR 80# x 30    Soleus pumps - ecc 40# x 20                   EOT Hip Ext/ Donkey Kick                  Manual         Knee Mobs/PROM Grade-     Patellar Mobs     Ankle Mobs/PROM Grade-          Therapeutic Exercise and NMR EXR  [x] (90099) Provided verbal/tactile cueing for activities related to strengthening, flexibility, endurance, ROM for improvements in LE, proximal hip, and core control with self care, mobility, lifting, ambulation. [x] (76340) Provided verbal/tactile cueing for activities related to improving balance, coordination, kinesthetic sense, posture, motor skill, proprioception  to assist with LE, proximal hip, and core control in self care, mobility, lifting, ambulation and eccentric single leg control.      NMR and Therapeutic Activities:    [] (67136 or 86050) Provided verbal/tactile cueing for activities related to improving balance, coordination, kinesthetic sense, posture, motor skill, proprioception and motor activation to allow for proper function of core, proximal hip and LE with self care and ADLs  [] (33363) Gait Re-education- Provided training and instruction to the patient for proper LE, core and proximal hip recruitment and positioning and eccentric body weight control with ambulation re-education including up and down stairs     Home Exercise Program:    [x] (47238) Reviewed/Progressed HEP activities related to strengthening, flexibility, endurance, ROM of core, proximal hip and LE for functional self-care, mobility, lifting and ambulation/stair navigation   [] (75345)Reviewed/Progressed HEP activities related to improving balance, coordination, kinesthetic sense, posture, motor skill, proprioception of core, proximal hip and LE for self care, mobility, lifting, and ambulation/stair navigation      Manual Treatments:  PROM / STM / Oscillations-Mobs:  G-I, II, III, IV (PA's, Inf., Post.) Calcaneous Ev mob, forefoot mob   [] ([id number]) Provided manual therapy to mobilize LE, proximal hip and/or LS spine soft tissue/joints for the purpose of modulating pain, promoting relaxation,  increasing ROM, reducing/eliminating soft tissue swelling/inflammation/restriction, improving soft tissue extensibility and allowing for proper ROM for normal function with self care, mobility, lifting and ambulation. Modalities:  CP x 15'    Charges:  Timed Code Treatment Minutes: 40   Total Treatment Minutes: 40     [] EVAL (LOW) 94304 (typically 20 minutes face-to-face)  [] EVAL (MOD) 78023 (typically 30 minutes face-to-face)  [] EVAL (HIGH) 01071 (typically 45 minutes face-to-face)  [] RE-EVAL     [x] WT(54830) x 2    [] IONTO  [x] NMR (24535) x 1    [] VASO  [] Manual (51365) x      [] Other:  [] TA x      [] Mech Traction (12678)  [] ES(attended) (01414)      [] ES (un) (03774):     GOALS:   Short Term Goals: To be achieved in: 2 weeks  1. Independent in HEP and progression per patient tolerance, in order to prevent re-injury. [] Progressing: [x] Met: [] Not Met: [] Adjusted   2. Patient will have a decrease in pain to facilitate improvement in movement, function, and ADLs as indicated by Functional Deficits. [] Progressing: [x] Met: [] Not Met: [] Adjusted    Long Term Goals: To be achieved in: 12 weeks  1.  Disability index score of 20% or less for the LEFS to assist with reaching prior level of function. [] Progressing: [] Met: [x] Not Met: [] Adjusted  2. Patient will demonstrate increased AROM to 10 ankle DF, 35 inv, 20 ever to allow for proper joint functioning as indicated by patients Functional Deficits. [] Progressing: [] Met: [x] Not Met: [] Adjusted  3. Patient will demonstrate an increase in Strength to 5/5 throughout ankle to allow for proper functional mobility as indicated by patients Functional Deficits. [] Progressing: [] Met: [x] Not Met: [] Adjusted   4. Patient will return to walking with normal gait pattern, no assistive device and without increased symptoms or restriction. [] Progressing: [] Met: [x] Not Met: [] Adjusted    Progression Towards Functional goals:  [] Patient is progressing as expected towards functional goals listed. [] Progression is slowed due to complexities listed. [] Progression has been slowed due to co-morbidities. [x] Plan just implemented, too soon to assess goals progression  [] Other:     ASSESSMENT:   Increased swelling of ankle/foot. Tolerated progression of exercises well with no increase in symptoms but did fatigue easily with T-band and seated HR. Treatment/Activity Tolerance:  [x] Patient tolerated treatment well [] Patient limited by fatique  [] Patient limited by pain  [] Patient limited by other medical complications  [] Other:     Prognosis: [x] Good [] Fair  [] Poor    Patient Requires Follow-up: [x] Yes  [] No    PLAN: See eval  [x] Continue per plan of care [] Alter current plan (see comments)  [] Plan of care initiated [] Hold pending MD visit [] Discharge      Electronically signed by:   Maynor Curtis, PTA  [id number], Michell Sender PT, OMT-C      Note: If patient does not return for scheduled/ recommended follow up visits, this note will serve as a discharge from care along with most recent update on progress.

## 2020-03-04 ENCOUNTER — HOSPITAL ENCOUNTER (OUTPATIENT)
Dept: PHYSICAL THERAPY | Age: 25
Setting detail: THERAPIES SERIES
Discharge: HOME | End: 2020-03-04
Payer: COMMERCIAL

## 2020-03-04 PROCEDURE — 97110 THERAPEUTIC EXERCISES: CPT | Performed by: SPECIALIST/TECHNOLOGIST

## 2020-03-04 PROCEDURE — 97112 NEUROMUSCULAR REEDUCATION: CPT | Performed by: SPECIALIST/TECHNOLOGIST

## 2020-03-04 NOTE — FLOWSHEET NOTE
[facility]- [facility]    Physical Therapy Treatment Note/ Progress Report:   Date:  3/4/2020    Patient Name:  Evelia Hess    :  1995  MRN: [id number]  Restrictions/Precautions:    Medical/Treatment Diagnosis Information:  · Diagnosis: Right achilles tendon repair   M25.571  · Treatment Diagnosis: PT treatment diagnosis:  right ankle pain, decreased mobility, weakness, altered gait  Z61.134  Insurance/Certification information:  [place]  75 visits/yr, $0 copay  Physician Information:  Referring Practitioner: Dr Delbert Strange of care signed (Y/N):     Date of Patient follow up with Physician:  3/31/20    Is this a Progress Report:     []  Yes  [x]  No        If Yes:  Date Range for reporting period:  Beginning20  Ending20    Progress report will be due (16 Rx or 30 days whichever is less):        Recertification will be due (POC Duration  / 90 days whichever is less): 20         Visit # Insurance Allowable Auth Required   9 75 []  Yes []  No        Functional Scale:  LEFS 28%   Date assessed:  20      Latex Allergy:  [x]NO      []YES  Preferred Language for Healthcare:   [x]English       []other    Pain level:  1/10     SUBJECTIVE:  Reports the achilles feel pretty good lately. Type: []Constant   [x]Intermitment  []Radiating []Localized  []other:     Functional Limitations: []Sitting [x]Standing [x]Walking    [x]Squatting [x]Stairs                [x]ADL's  []Driving []Sports/Recreation  []Other:      OBJECTIVE:     ROM Current (R)   Ankle DF 11°   PF 47°   Inv 35°   EV 18°   Strength N/T                     Gait: ambulating with boot and 1 crutch with minimal to no difficulties.      Joint mobility:    []Normal    [x]Hypo   []Hyper    Palpation:     Orthopedic Tests:     RESTRICTIONS/PRECAUTIONS: Achilles repair guidelines, wear the boot for 2 more wks and then wean out of the boot per MD note on 20    Exercises/Interventions: DOS: 1/9/20  Stretching Reps Notes/Last Progression   Bike  5'    Airdyne     Eliptical     Gastroc/ Soleus Stretch  3x30'' each slant   Hamstring Stretch: Tableside/ Supine     Piriformis Cross Over     Figure 4 Piriformis     Supine ITB      SKC     DKC     Adductor Stretch     Heel Prop/ Prone Hang     Wallslide     SKTC with SB     BKFO                   Exercises              Add Iso     Quad Sets     SAQ           SLR Ext     Clamshells     Manual PF 2 x 30       Ankle Theraband  blue TB 30x each 4 way - HEP      HR/TR - standing X 20 ea          SLB 3 x 30\"    3 way balance 1' ea 1/2 roll    Mini squats on BOSU - blue side X 20    PEP 2'       Soleus pumps - SL  40# x 20    FSU 6' x 20     Step down 4' x 15    3 pt. excursion 10 sets     SLB ball toss in tramp 2# x 20                      Manual         Knee Mobs/PROM Grade-     Patellar Mobs     Ankle Mobs/PROM Grade-          Therapeutic Exercise and NMR EXR  [x] (64692) Provided verbal/tactile cueing for activities related to strengthening, flexibility, endurance, ROM for improvements in LE, proximal hip, and core control with self care, mobility, lifting, ambulation. [x] (89549) Provided verbal/tactile cueing for activities related to improving balance, coordination, kinesthetic sense, posture, motor skill, proprioception  to assist with LE, proximal hip, and core control in self care, mobility, lifting, ambulation and eccentric single leg control.      NMR and Therapeutic Activities:    [] (26321 or 09378) Provided verbal/tactile cueing for activities related to improving balance, coordination, kinesthetic sense, posture, motor skill, proprioception and motor activation to allow for proper function of core, proximal hip and LE with self care and ADLs  [] (39695) Gait Re-education- Provided training and instruction to the patient for proper LE, core and proximal hip recruitment and positioning and eccentric body weight control with ambulation re-education including up and down stairs     Home Exercise Program:    [x] (38747) Reviewed/Progressed HEP activities related to strengthening, flexibility, endurance, ROM of core, proximal hip and LE for functional self-care, mobility, lifting and ambulation/stair navigation   [] (30043)Reviewed/Progressed HEP activities related to improving balance, coordination, kinesthetic sense, posture, motor skill, proprioception of core, proximal hip and LE for self care, mobility, lifting, and ambulation/stair navigation      Manual Treatments:  PROM / STM / Oscillations-Mobs:  G-I, II, III, IV (PA's, Inf., Post.) Calcaneous Ev mob, forefoot mob   [] ([id number]) Provided manual therapy to mobilize LE, proximal hip and/or LS spine soft tissue/joints for the purpose of modulating pain, promoting relaxation,  increasing ROM, reducing/eliminating soft tissue swelling/inflammation/restriction, improving soft tissue extensibility and allowing for proper ROM for normal function with self care, mobility, lifting and ambulation. Modalities:  CP x 15'    Charges:  Timed Code Treatment Minutes: 40   Total Treatment Minutes: 40     [] EVAL (LOW) 28307 (typically 20 minutes face-to-face)  [] EVAL (MOD) 39934 (typically 30 minutes face-to-face)  [] EVAL (HIGH) 10797 (typically 45 minutes face-to-face)  [] RE-EVAL     [x] XX(49999) x 2    [] IONTO  [x] NMR (14999) x 1    [] VASO  [] Manual (31429) x      [] Other:  [] TA x      [] Mech Traction (44438)  [] ES(attended) (90158)      [] ES (un) (27873):     GOALS:   Short Term Goals: To be achieved in: 2 weeks  1. Independent in HEP and progression per patient tolerance, in order to prevent re-injury. [] Progressing: [x] Met: [] Not Met: [] Adjusted   2. Patient will have a decrease in pain to facilitate improvement in movement, function, and ADLs as indicated by Functional Deficits. [] Progressing: [x] Met: [] Not Met: [] Adjusted    Long Term Goals: To be achieved in: 12 weeks  1. Disability index score of 20% or less for the LEFS to assist with reaching prior level of function. [] Progressing: [] Met: [x] Not Met: [] Adjusted  2. Patient will demonstrate increased AROM to 10 ankle DF, 35 inv, 20 ever to allow for proper joint functioning as indicated by patients Functional Deficits. [] Progressing: [] Met: [x] Not Met: [] Adjusted  3. Patient will demonstrate an increase in Strength to 5/5 throughout ankle to allow for proper functional mobility as indicated by patients Functional Deficits. [] Progressing: [] Met: [x] Not Met: [] Adjusted   4. Patient will return to walking with normal gait pattern, no assistive device and without increased symptoms or restriction. [] Progressing: [] Met: [x] Not Met: [] Adjusted    Progression Towards Functional goals:  [] Patient is progressing as expected towards functional goals listed. [] Progression is slowed due to complexities listed. [] Progression has been slowed due to co-morbidities. [x] Plan just implemented, too soon to assess goals progression  [] Other:     ASSESSMENT:   Increased swelling of ankle/foot. Tolerated progression of exercises well with no increase in symptoms but did fatigue easily with T-band and seated HR. Treatment/Activity Tolerance:  [x] Patient tolerated treatment well [] Patient limited by fatique  [] Patient limited by pain  [] Patient limited by other medical complications  [] Other:     Prognosis: [x] Good [] Fair  [] Poor    Patient Requires Follow-up: [x] Yes  [] No    PLAN: See eval  [x] Continue per plan of care [] Alter current plan (see comments)  [] Plan of care initiated [] Hold pending MD visit [] Discharge      Electronically signed by:   Tammie Aaron, PTA  [id number], Brian Alegria PT, OMT-C      Note: If patient does not return for scheduled/ recommended follow up visits, this note will serve as a discharge from care along with most recent update on progress.

## 2020-03-11 ENCOUNTER — APPOINTMENT (OUTPATIENT)
Dept: PHYSICAL THERAPY | Age: 25
End: 2020-03-11
Payer: COMMERCIAL

## 2020-03-12 ENCOUNTER — APPOINTMENT (OUTPATIENT)
Dept: PHYSICAL THERAPY | Age: 25
End: 2020-03-12
Payer: COMMERCIAL

## 2020-03-13 ENCOUNTER — HOSPITAL ENCOUNTER (OUTPATIENT)
Dept: PHYSICAL THERAPY | Age: 25
Setting detail: THERAPIES SERIES
Discharge: HOME | End: 2020-03-13
Payer: COMMERCIAL

## 2020-03-13 PROCEDURE — 97112 NEUROMUSCULAR REEDUCATION: CPT

## 2020-03-13 PROCEDURE — 97110 THERAPEUTIC EXERCISES: CPT

## 2020-03-13 NOTE — FLOWSHEET NOTE
The [facility]- Orthopaedics and Sports Rehabilitation, [place]    Physical Therapy Treatment Note/ Progress Report:   Date:  3/13/2020    Patient Name:  Lorelei Homans    :  1995  MRN: [id number]  Restrictions/Precautions:    Medical/Treatment Diagnosis Information:  · Diagnosis: Right achilles tendon repair   M25.571  · Treatment Diagnosis: PT treatment diagnosis:  right ankle pain, decreased mobility, weakness, altered gait  G94.887  Insurance/Certification information:  [place]  75 visits/yr, $0 copay  Physician Information:  Referring Practitioner: Dr Zuleyma Blanco of care signed (Y/N):     Date of Patient follow up with Physician:  3/31/20    Is this a Progress Report:     []  Yes  [x]  No        If Yes:  Date Range for reporting period:  Beginning20  Ending20    Progress report will be due (16 Rx or 30 days whichever is less):        Recertification will be due (POC Duration  / 90 days whichever is less): 20         Visit # Insurance Allowable Auth Required   10 75 []  Yes []  No        Functional Scale:  LEFS 28%   Date assessed:  20      Latex Allergy:  [x]NO      []YES  Preferred Language for Healthcare:   [x]English       []other    Pain level:  1/10     SUBJECTIVE:  Minimal swelling and no pain noted with activities. Type: []Constant   [x]Intermitment  []Radiating []Localized  []other:     Functional Limitations: []Sitting [x]Standing [x]Walking    [x]Squatting [x]Stairs                [x]ADL's  []Driving []Sports/Recreation  []Other:      OBJECTIVE:     ROM Current (R)   Ankle DF 11°   PF 47°   Inv 35°   EV 18°   Strength N/T                     Gait: ambulating with boot and 1 crutch with minimal to no difficulties.      Joint mobility:    []Normal    [x]Hypo   []Hyper    Palpation:     Orthopedic Tests:     RESTRICTIONS/PRECAUTIONS: Achilles repair guidelines, wear the boot for 2 more wks and then wean out of the boot per MD note on 2/18/20    Exercises/Interventions:             DOS: 1/9/20  Stretching Reps Notes/Last Progression   Bike  5'    Airdyne     Eliptical     Gastroc/ Soleus Stretch  3x30'' each slant   Hamstring Stretch: Tableside/ Supine     Piriformis Cross Over     Figure 4 Piriformis     Supine ITB      SKC     DKC     Adductor Stretch     Heel Prop/ Prone Hang     Wallslide     SKTC with SB     BKFO                   Exercises              Add Iso     Quad Sets     SAQ           SLR Ext     Clamshells     Manual PF 2 x 30       Ankle Theraband  blue TB 30x each 4 way - HEP      HR/TR - standing X 20 ea          SLB  rebounder 3 x 30\"  x45 On foam with BB OH EC  SL on foam   3 way balance 1' ea 1/2 roll    Mini squats on BOSU - blue side X 20    PEP 2'       Soleus pumps - SL  40# x 20    FSU 6' x 20  With water stick 8#   Step down 4' x 15    3 pt. excursion 10 sets     SLB ball toss in tramp 2# x 20    Lateral steps  Maroon x3     Ecc heel raise  Heel raise off step 2x10   To neutral                 Manual         Knee Mobs/PROM Grade-     Patellar Mobs     Ankle Mobs/PROM Grade-          Therapeutic Exercise and NMR EXR  [x] (21909) Provided verbal/tactile cueing for activities related to strengthening, flexibility, endurance, ROM for improvements in LE, proximal hip, and core control with self care, mobility, lifting, ambulation. [x] (07349) Provided verbal/tactile cueing for activities related to improving balance, coordination, kinesthetic sense, posture, motor skill, proprioception  to assist with LE, proximal hip, and core control in self care, mobility, lifting, ambulation and eccentric single leg control.      NMR and Therapeutic Activities:    [] (85627 or 95761) Provided verbal/tactile cueing for activities related to improving balance, coordination, kinesthetic sense, posture, motor skill, proprioception and motor activation to allow for proper function of core, proximal hip and LE with self care and ADLs  []

## 2020-03-17 ENCOUNTER — HOSPITAL ENCOUNTER (OUTPATIENT)
Dept: PHYSICAL THERAPY | Age: 25
Setting detail: THERAPIES SERIES
Discharge: HOME | End: 2020-03-17
Payer: COMMERCIAL

## 2020-03-20 ENCOUNTER — HOSPITAL ENCOUNTER (OUTPATIENT)
Dept: PHYSICAL THERAPY | Age: 25
Setting detail: THERAPIES SERIES
Discharge: HOME | End: 2020-03-20
Payer: COMMERCIAL

## 2020-03-20 PROCEDURE — 97110 THERAPEUTIC EXERCISES: CPT

## 2020-03-20 PROCEDURE — 97112 NEUROMUSCULAR REEDUCATION: CPT

## 2020-03-20 NOTE — FLOWSHEET NOTE
proprioception and motor activation to allow for proper function of core, proximal hip and LE with self care and ADLs  [] (82907) Gait Re-education- Provided training and instruction to the patient for proper LE, core and proximal hip recruitment and positioning and eccentric body weight control with ambulation re-education including up and down stairs     Home Exercise Program:    [x] (80406) Reviewed/Progressed HEP activities related to strengthening, flexibility, endurance, ROM of core, proximal hip and LE for functional self-care, mobility, lifting and ambulation/stair navigation   [] (68983)Reviewed/Progressed HEP activities related to improving balance, coordination, kinesthetic sense, posture, motor skill, proprioception of core, proximal hip and LE for self care, mobility, lifting, and ambulation/stair navigation      Manual Treatments:  PROM / STM / Oscillations-Mobs:  G-I, II, III, IV (PA's, Inf., Post.) Calcaneous Ev mob, forefoot mob   [] ([id number]) Provided manual therapy to mobilize LE, proximal hip and/or LS spine soft tissue/joints for the purpose of modulating pain, promoting relaxation,  increasing ROM, reducing/eliminating soft tissue swelling/inflammation/restriction, improving soft tissue extensibility and allowing for proper ROM for normal function with self care, mobility, lifting and ambulation. Modalities:      Charges:  Timed Code Treatment Minutes: 55   Total Treatment Minutes: 55     [] EVAL (LOW) 80285 (typically 20 minutes face-to-face)  [] EVAL (MOD) 54018 (typically 30 minutes face-to-face)  [] EVAL (HIGH) 85840 (typically 45 minutes face-to-face)  [] RE-EVAL     [x] HH(16671) x 2    [] IONTO  [x] NMR (54808) x 2    [] VASO  [] Manual (18216) x      [] Other:  [] TA x      [] Mech Traction (05731)  [] ES(attended) (71807)      [] ES (un) (24149):     GOALS:   Short Term Goals: To be achieved in: 2 weeks  1.  Independent in HEP and progression per patient tolerance, in order to prevent re-injury. [] Progressing: [x] Met: [] Not Met: [] Adjusted   2. Patient will have a decrease in pain to facilitate improvement in movement, function, and ADLs as indicated by Functional Deficits. [] Progressing: [x] Met: [] Not Met: [] Adjusted    Long Term Goals: To be achieved in: 12 weeks  1. Disability index score of 20% or less for the LEFS to assist with reaching prior level of function. [] Progressing: [x] Met: [] Not Met: [] Adjusted  2. Patient will demonstrate increased AROM to 10 ankle DF, 35 inv, 20 ever to allow for proper joint functioning as indicated by patients Functional Deficits. [] Progressing: [] Met: [x] Not Met: [] Adjusted  3. Patient will demonstrate an increase in Strength to 5/5 throughout ankle to allow for proper functional mobility as indicated by patients Functional Deficits. [] Progressing: [x] Met: [] Not Met: [] Adjusted   4. Patient will return to walking with normal gait pattern, no assistive device and without increased symptoms or restriction. [] Progressing: [x] Met: [] Not Met: [] Adjusted    Progression Towards Functional goals:  [x] Patient is progressing as expected towards functional goals listed. [] Progression is slowed due to complexities listed. [] Progression has been slowed due to co-morbidities. [] Plan just implemented, too soon to assess goals progression  [] Other:     ASSESSMENT:   Swelling improved today. Tolerated progression of exercises well with no increase in symptoms but did fatigue w/ increased eccentrics and progression of SLS activities. Encouraged HEP consistency and to continue to hold on jumping/running/plyometrics. Informed pt of potential hold on PT due to COVID pt access restrictions but continued check ins and possibility of setting up tele-health communications within a few weeks.      Treatment/Activity Tolerance:  [x] Patient tolerated treatment well [] Patient limited by fatique  [] Patient limited by pain  [] Patient limited

## 2020-03-25 ENCOUNTER — APPOINTMENT (OUTPATIENT)
Dept: PHYSICAL THERAPY | Age: 25
End: 2020-03-25
Payer: COMMERCIAL

## 2020-04-01 ENCOUNTER — OFFICE VISIT (OUTPATIENT)
Dept: ORTHOPEDIC SURGERY | Age: 25
End: 2020-04-01

## 2020-04-01 VITALS — WEIGHT: 225.09 LBS | RESPIRATION RATE: 17 BRPM | HEIGHT: 74 IN | BODY MASS INDEX: 28.89 KG/M2

## 2020-04-01 PROCEDURE — 99024 POSTOP FOLLOW-UP VISIT: CPT | Performed by: ORTHOPAEDIC SURGERY

## 2020-04-01 NOTE — PROGRESS NOTES
Range of motion is unremarkable. There is no gross instability. There are no rashes, ulcerations or lesions. Strength and tone are normal.            Assessment : Status post right ankle open Achilles repair    Impression:  Encounter Diagnosis   Name Primary?  Rupture of right Achilles tendon, initial encounter Yes       Office Procedures:  No orders of the defined types were placed in this encounter. Treatment Plan: He is doing very well at this time. He does have a workout area in his basement where he can continue to do strength work. He may start progressing back to light jogging and eventually over the next 8 to 12 weeks he may start progressing back to running and jumping as his symptoms allow.   I will see him back if he has any further problems in the future

## (undated) DEVICE — T-DRAPE,EXTREMITY,STERILE: Brand: MEDLINE

## (undated) DEVICE — DRAPE,U/ SHT,SPLIT,PLAS,STERIL: Brand: MEDLINE

## (undated) DEVICE — PADDING CAST W6INXL4YD ST COT RAYON MICROPLEATED HIGHLY

## (undated) DEVICE — TOWEL,OR,DSP,ST,BLUE,STD,4/PK,20PK/CS: Brand: MEDLINE

## (undated) DEVICE — ARM CRADLE: Brand: DEVON

## (undated) DEVICE — SUTURE NONABSORBABLE MONOFILAMENT 3-0 PS-1 18 IN BLK ETHILON 1663H

## (undated) DEVICE — BANDAGE COMPR W6INXL10YD ST M E WHITE/BEIGE

## (undated) DEVICE — SPONGE LAP W18XL18IN WHT COT 4 PLY FLD STRUNG RADPQ DISP ST

## (undated) DEVICE — SUTURE VCRL SZ 0 L36IN ABSRB UD L36MM CT-1 1/2 CIR J946H

## (undated) DEVICE — INTENDED FOR TISSUE SEPARATION, AND OTHER PROCEDURES THAT REQUIRE A SHARP SURGICAL BLADE TO PUNCTURE OR CUT.: Brand: BARD-PARKER ® STAINLESS STEEL BLADES

## (undated) DEVICE — SUTURE VCRL SZ 3-0 L18IN ABSRB UD L26MM SH 1/2 CIR J864D

## (undated) DEVICE — MASC TURNOVER KIT: Brand: MEDLINE INDUSTRIES, INC.

## (undated) DEVICE — PAD,ABDOMINAL,8"X10",ST,LF: Brand: MEDLINE

## (undated) DEVICE — GAUZE,SPONGE,4"X4",8PLY,STRL,LF,10/TRAY: Brand: MEDLINE

## (undated) DEVICE — APPLICATOR PREP 26ML 0.7% IOD POVACRYLEX 74% ISO ALC ST

## (undated) DEVICE — MEDICINE CUP, GRADUATED, STER: Brand: MEDLINE

## (undated) DEVICE — HYPODERMIC SAFETY NEEDLE: Brand: MAGELLAN

## (undated) DEVICE — ULTRABRAID 2 COBRAID 38 LENGTH SINGL: Brand: ULTRABRAID

## (undated) DEVICE — DRESSING,GAUZE,XEROFORM,CURAD,1"X8",ST: Brand: CURAD

## (undated) DEVICE — BANDAGE,ELASTIC,ESMARK,STERILE,6"X9',LF: Brand: MEDLINE

## (undated) DEVICE — YANKAUER,BULB TIP,W/O VENT,RIGID,STERILE: Brand: MEDLINE

## (undated) DEVICE — STOCKINETTE,IMPERVIOUS,12X48,STERILE: Brand: MEDLINE

## (undated) DEVICE — PADDING CAST W4INXL4YD ST COT RAYON MICROPLEATED HIGHLY

## (undated) DEVICE — GLOVE ORANGE PI 8   MSG9080

## (undated) DEVICE — PACK PROCEDURE SURG EXTREMITY MFFOP CUST

## (undated) DEVICE — SYRINGE IRRIG 60ML SFT PLIABLE BLB EZ TO GRP 1 HND USE W/

## (undated) DEVICE — BLADE CLIPPER GEN PURP NS

## (undated) DEVICE — NEEDLE SUT SZ 4 MAYO CATGUT 1/2 CIR TAPR PNT DISP